# Patient Record
Sex: FEMALE | Race: WHITE | Employment: OTHER | URBAN - METROPOLITAN AREA
[De-identification: names, ages, dates, MRNs, and addresses within clinical notes are randomized per-mention and may not be internally consistent; named-entity substitution may affect disease eponyms.]

---

## 2018-01-22 PROBLEM — R94.31 ABNORMAL ECG: Status: ACTIVE | Noted: 2018-01-22

## 2018-01-22 PROBLEM — R01.1 HEART MURMUR: Status: ACTIVE | Noted: 2018-01-22

## 2018-01-22 PROBLEM — I10 ESSENTIAL HYPERTENSION: Status: ACTIVE | Noted: 2018-01-22

## 2019-04-02 ENCOUNTER — APPOINTMENT (OUTPATIENT)
Dept: CT IMAGING | Age: 74
End: 2019-04-02
Payer: MEDICARE

## 2019-04-02 ENCOUNTER — HOSPITAL ENCOUNTER (EMERGENCY)
Age: 74
Discharge: HOME | End: 2019-04-02
Attending: EMERGENCY MEDICINE
Payer: MEDICARE

## 2019-04-02 VITALS
HEIGHT: 62 IN | OXYGEN SATURATION: 96 % | HEART RATE: 84 BPM | RESPIRATION RATE: 14 BRPM | DIASTOLIC BLOOD PRESSURE: 67 MMHG | BODY MASS INDEX: 23.92 KG/M2 | WEIGHT: 130 LBS | TEMPERATURE: 98.2 F | SYSTOLIC BLOOD PRESSURE: 138 MMHG

## 2019-04-02 DIAGNOSIS — S32.10XA CLOSED FRACTURE OF SACRUM, UNSPECIFIED FRACTURE MORPHOLOGY, INITIAL ENCOUNTER (HCC): Primary | ICD-10-CM

## 2019-04-02 LAB
-: ABNORMAL
AMORPHOUS: ABNORMAL
BACTERIA: ABNORMAL
BILIRUBIN URINE: NEGATIVE
CASTS UA: ABNORMAL /LPF
COLOR: YELLOW
COMMENT UA: ABNORMAL
CRYSTALS, UA: ABNORMAL /HPF
EPITHELIAL CELLS UA: ABNORMAL /HPF
GLUCOSE URINE: NEGATIVE
KETONES, URINE: NEGATIVE
LEUKOCYTE ESTERASE, URINE: NEGATIVE
MUCUS: ABNORMAL
NITRITE, URINE: NEGATIVE
OTHER OBSERVATIONS UA: ABNORMAL
PH UA: 6
PROTEIN UA: NEGATIVE
RBC UA: ABNORMAL /HPF
RENAL EPITHELIAL, UA: ABNORMAL /HPF
SPECIFIC GRAVITY UA: 1.01
TRICHOMONAS: ABNORMAL
TURBIDITY: CLEAR
URINE HGB: ABNORMAL
UROBILINOGEN, URINE: NORMAL
WBC UA: ABNORMAL /HPF
YEAST: ABNORMAL

## 2019-04-02 PROCEDURE — 73700 CT LOWER EXTREMITY W/O DYE: CPT

## 2019-04-02 PROCEDURE — 81001 URINALYSIS AUTO W/SCOPE: CPT

## 2019-04-02 PROCEDURE — 99284 EMERGENCY DEPT VISIT MOD MDM: CPT

## 2019-04-02 ASSESSMENT — PAIN SCALES - GENERAL: PAINLEVEL_OUTOF10: 8

## 2019-04-02 ASSESSMENT — PAIN DESCRIPTION - ORIENTATION: ORIENTATION: LEFT

## 2019-04-02 ASSESSMENT — PAIN DESCRIPTION - PAIN TYPE: TYPE: ACUTE PAIN

## 2019-04-02 ASSESSMENT — PAIN DESCRIPTION - LOCATION: LOCATION: HIP

## 2019-04-02 NOTE — ED PROVIDER NOTES
[facility] ED  [address] [facility] [address]  Phone: [phone]  Fax: [phone]    Pt Name: Kathy Chan  MRN: [id number]  [place] 1945  Date of evaluation: 4/2/2019      CHIEF COMPLAINT       Chief Complaint   Patient presents with    Hip Pain     left     Fall         HISTORY OF PRESENT ILLNESS     Kathy Chan is a 68 y.o. female who presents fell and injured her left hip morning. Since she has a right pelvis fracture from a fall about a month ago. She states her pain is an 8 out of a 10 but does not want any pain medication at this time. Left leg symptoms. No back pain. No abdominal symptoms. Brought in by her relative. Her vital signs are normal with exception of a 762 systolic blood pressure        REVIEW OF SYSTEMS       Other ROS negative except as noted above. PAST MEDICAL HISTORY    has a past medical history of Arthritis, Hypertension, Pelvic fracture ([address] and Seizures ([address] SURGICAL HISTORY      has a past surgical history that includes Cholecystectomy; Tonsillectomy; and joint replacement (2014). CURRENT MEDICATIONS       Previous Medications    CALCIUM-VITAMIN D (OSCAL-500) 500-200 MG-UNIT PER TABLET    Take 1 tablet by mouth daily    CLOTRIMAZOLE (LOTRIMIN) 1 % CREAM    Apply topically 2 times daily Apply topically 2 times daily. ERYTHROMYCIN WITH ETHANOL (THERAMYCIN) 2 % EXTERNAL SOLUTION    Apply topically daily Apply topically daily.     HYDROXYCHLOROQUINE (PLAQUENIL) 200 MG TABLET    Take 200 mg by mouth daily    LEVETIRACETAM (KEPPRA) 250 MG TABLET    Take 500 mg by mouth 2 times daily    LORAZEPAM (ATIVAN) 1 MG TABLET    Take 1 mg by mouth nightly as needed for Anxiety    MELOXICAM (MOBIC PO)    Take by mouth    METOPROLOL (LOPRESSOR) 100 MG TABLET    Take 100 mg by mouth daily    MONTELUKAST (SINGULAIR) 10 MG TABLET    Take 10 mg by mouth nightly    MULTIPLE VITAMINS-MINERALS (THERAPEUTIC MULTIVITAMIN-MINERALS) TABLET    Take 1 tablet by mouth daily    OCCULT BLOOD STRP    by In Vitro route    PHENOBARBITAL (LUMINAL) 15 MG TABLET    Take 16.2 mg by mouth 2 times daily    PRIMIDONE (MYSOLINE) 250 MG TABLET    Take 250 mg by mouth 2 times daily    PROBIOTIC PRODUCT (PROBIOTIC ADVANCED PO)    Take by mouth    PSYLLIUM (METAMUCIL) 58.6 % PACKET    Take 1 packet by mouth daily       ALLERGIES     is allergic to latex; aloe; and codeine. FAMILY HISTORY     has no family status information on file. family history is not on file. SOCIAL HISTORY      reports that she has never smoked. She has never used smokeless tobacco. She reports that she does not drink alcohol or use drugs. PHYSICAL EXAM     INITIAL VITALS:  height is 5' 2\" (1.575 m) and weight is 59 kg (130 lb). Her oral temperature is 97.4 °F (36.3 °C). Her blood pressure is 163/86 (abnormal) and her pulse is 77. Her respiration is 14 and oxygen saturation is 96%. Constitutional: The patient is alert, well-developed, in no acute distress. Vital signs as noted. Left hip:  pain to palpation over the left hip with limited range of motion due to pain. The rest the pelvis is stable and nontender to palpation. The right hip is normal.  Abdomen soft positive bowel sounds without masses or tenderness  HEENT is negative and the heart and lung exams are normal  Abdomen is soft positive bowel sounds without masses or tenderness    DIFFERENTIAL DIAGNOSIS/ MEDICAL DECISION MAKING:     Social workers contacted and the patient will be transferred to [facility] for further care. Patient is agreeable    No complaint of pain while lying    Follow Exit Care instructions closely. I have reviewed the disposition diagnosis with the patient and or their family/guardian. I have answered their questions and given discharge instructions. They voiced understanding of these instructions and did not have any further questions or complaints.     DIAGNOSTIC RESULTS     RADIOLOGY:   Non-plain film images such as CT, Ultrasound and MRI are read by the radiologist. Plain radiographic images are visualized and preliminarily interpreted by the emergency physician with the below findings:  CT Hip Left WO Contrast   Preliminary Result   1. Acute nondisplaced extraforaminal left sacral alar fracture. 2. No definite proximal left femoral fracture; if there is continued concern,   MRI should be considered. 3. Ongoing healing of comminuted right pubic body fracture extending into   right inferior pubic ramus. LABS:  No results found for this visit on 04/02/19. ABNORMAL LABS:  Labs Reviewed - No data to display           EMERGENCY DEPARTMENT COURSE:   Vitals:    Vitals:    04/02/19 1052   BP: (!) 163/86   Pulse: 77   Resp: 14   Temp: 97.4 °F (36.3 °C)   TempSrc: Oral   SpO2: 96%   Weight: 59 kg (130 lb)   Height: 5' 2\" (1.575 m)     -------------------------  BP: (!) 163/86, Temp: 97.4 °F (36.3 °C), Pulse: 77, Resp: 14    See DDX/MDM  (Differential Diagnosis/Medical Decision Making) above. FINAL IMPRESSION      1. Closed fracture of sacrum, unspecified fracture morphology, initial encounter [facility])          DISPOSITION/PLAN   DISPOSITION Decision To Discharge 04/02/2019 02:39:00 PM      Condition on Disposition    Fair    PATIENT REFERRED TO:  No follow-up provider specified.     DISCHARGE MEDICATIONS:  New Prescriptions    No medications on file       (Please note that portions of this note were completed with a voice recognition program.  Efforts were made to edit the dictations but occasionally words are mis-transcribed.)    Finley DO  Attending Emergency Physician       Leonarda Rapp DO  04/02/19 DO Thor  04/02/19 4447

## 2019-04-02 NOTE — ED NOTES
Pt given lunch, positioned to sitting position. [facility]         Humberto Tran RN  04/02/19 5492

## 2019-04-02 NOTE — ED NOTES
Stefan Dunham, DO at bedside.  Social work called, message left for pot'l placement       Sy Kraft, RN  04/02/19 28628 Komal Hawthorne, RN  04/02/19 2310

## 2019-04-02 NOTE — ED NOTES
[place] staff at bedside, [facility] ok for transportation to be arranged. [facility] contacted.       Salem Carrel, RN  04/02/19 0202

## 2019-04-02 NOTE — ED NOTES
Spoke with armen at [facility], states they are working on getting a bed assignment for pt. Informed them that transport has been set up for around 1640. Verbalized understanding, states they will call back with a bed assignment and to get report.         Kamla Syeks RN  04/02/19 8253

## 2019-04-02 NOTE — ED NOTES
Pt to go to [facility] will be in to see patient and then we will arrange transportation to get pt to facility.         Raven Campbell RN  04/02/19 1060

## 2019-04-02 NOTE — ED NOTES
at the bedside to discuss placement options with pt et family.      Mervin Cordero RN  04/02/19 6443

## 2019-04-02 NOTE — ED NOTES
Pt presents to ed with c/o left sided hip pain s/p fall this am.  Pt states she fell around 0530 this am.  Pt reports that her knees felt weak and that she eased herself down to the ground. Pt states she did not hit her head or sustain any other injuries. Reports that EMS was called to assist her to get off the floor. Pt refused transport to a facility at that time. Pts family is at the bedside today and states pt has had freqent falls over the past month. States that pt recently fell and has a right pelvic fracture that is currently being managed medically. Pt lives alone at home but family states that in 2 wks she will be moving into an AL facility. Upon arrival pt is brought to room 9 via wheelchair. She is able to tx to the bed with moderate amt RN support. Pt appears to be generally weak. /  Currently rates her pain at an 8 on 0-10 scale, states she did take some tylenol this am.  There are no obvious deformities, bruises or swelling noted. PT denies numbness or tingling to the extremities. She is not taking any blood thinning medications at this time. Call light placed within reach, denies needs. Will continue to monitor.       Arthur Patel RN  04/02/19 3428

## 2020-04-29 ENCOUNTER — HOSPITAL ENCOUNTER (OUTPATIENT)
Age: 75
Discharge: HOME | End: 2020-04-29
Payer: MEDICARE

## 2020-04-29 PROCEDURE — U0002 COVID-19 LAB TEST NON-CDC: HCPCS

## 2020-04-30 ENCOUNTER — HOSPITAL ENCOUNTER (OUTPATIENT)
Age: 75
Discharge: HOME | End: 2020-04-30
Payer: MEDICARE

## 2020-04-30 PROCEDURE — U0003 INFECTIOUS AGENT DETECTION BY NUCLEIC ACID (DNA OR RNA); SEVERE ACUTE RESPIRATORY SYNDROME CORONAVIRUS 2 (SARS-COV-2) (CORONAVIRUS DISEASE [COVID-19]), AMPLIFIED PROBE TECHNIQUE, MAKING USE OF HIGH THROUGHPUT TECHNOLOGIES AS DESCRIBED BY CMS-2020-01-R: HCPCS

## 2020-05-03 LAB — SARS-COV-2, NAA: NOT DETECTED

## 2020-05-04 ENCOUNTER — TELEPHONE (OUTPATIENT)
Dept: PRIMARY CARE CLINIC | Age: 75
End: 2020-05-04

## 2022-09-18 ENCOUNTER — HOSPITAL ENCOUNTER (EMERGENCY)
Age: 77
Discharge: HOME | End: 2022-09-19
Attending: EMERGENCY MEDICINE
Payer: MEDICARE

## 2022-09-18 ENCOUNTER — APPOINTMENT (OUTPATIENT)
Dept: CT IMAGING | Age: 77
End: 2022-09-18
Payer: MEDICARE

## 2022-09-18 ENCOUNTER — APPOINTMENT (OUTPATIENT)
Dept: GENERAL RADIOLOGY | Age: 77
End: 2022-09-18
Payer: MEDICARE

## 2022-09-18 DIAGNOSIS — S83.91XA SPRAIN OF RIGHT KNEE, UNSPECIFIED LIGAMENT, INITIAL ENCOUNTER: ICD-10-CM

## 2022-09-18 DIAGNOSIS — J01.10 ACUTE FRONTAL SINUSITIS, RECURRENCE NOT SPECIFIED: ICD-10-CM
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
## 2022-09-18 DIAGNOSIS — R29.6 MULTIPLE FALLS: ICD-10-CM

## 2022-09-18 DIAGNOSIS — N39.0 URINARY TRACT INFECTION WITH HEMATURIA, SITE UNSPECIFIED: ICD-10-CM

## 2022-09-18 DIAGNOSIS — R31.9 URINARY TRACT INFECTION WITH HEMATURIA, SITE UNSPECIFIED: ICD-10-CM

## 2022-09-18 LAB
ABSOLUTE EOS #: 0.1 K/UL (ref 0–0.4)
ABSOLUTE LYMPH #: 0.6 K/UL (ref 1–4.8)
ABSOLUTE MONO #: 0.3 K/UL (ref 0.1–1.2)
ANION GAP SERPL CALCULATED.3IONS-SCNC: 10 MMOL/L (ref 9–17)
BASOPHILS # BLD: 1 % (ref 0–2)
BASOPHILS ABSOLUTE: 0 K/UL (ref 0–0.2)
BUN BLDV-MCNC: 19 MG/DL (ref 8–23)
CALCIUM SERPL-MCNC: 8.6 MG/DL (ref 8.6–10.4)
CHLORIDE BLD-SCNC: 102 MMOL/L (ref 98–107)
CO2: 27 MMOL/L (ref 20–31)
CREAT SERPL-MCNC: 0.77 MG/DL (ref 0.5–0.9)
EOSINOPHILS RELATIVE PERCENT: 2 % (ref 1–4)
GFR AFRICAN AMERICAN: >60 ML/MIN
GFR NON-AFRICAN AMERICAN: >60 ML/MIN
GFR SERPL CREATININE-BSD FRML MDRD: ABNORMAL ML/MIN/{1.73_M2}
GLUCOSE BLD-MCNC: 118 MG/DL (ref 70–99)
HCT VFR BLD CALC: 30.1 % (ref 36–46)
HEMOGLOBIN: 10.7 G/DL (ref 12–16)
INR BLD: 1
LYMPHOCYTES # BLD: 11 % (ref 24–44)
MCH RBC QN AUTO: 33.1 PG (ref 26–34)
MCHC RBC AUTO-ENTMCNC: 35.5 G/DL (ref 31–37)
MCV RBC AUTO: 93.3 FL (ref 80–100)
MONOCYTES # BLD: 5 % (ref 2–11)
PDW BLD-RTO: 13.5 % (ref 12.5–15.4)
PLATELET # BLD: 127 K/UL (ref 140–450)
PMV BLD AUTO: 6.9 FL (ref 6–12)
POTASSIUM SERPL-SCNC: 4 MMOL/L (ref 3.7–5.3)
PROTHROMBIN TIME: 10.6 SEC (ref 9.4–12.6)
RBC # BLD: 3.22 M/UL (ref 4–5.2)
SEG NEUTROPHILS: 81 % (ref 36–66)
SEGMENTED NEUTROPHILS ABSOLUTE COUNT: 4.8 K/UL (ref 1.8–7.7)
SODIUM BLD-SCNC: 139 MMOL/L (ref 135–144)
WBC # BLD: 5.8 K/UL (ref 3.5–11)

## 2022-09-18 PROCEDURE — 85610 PROTHROMBIN TIME: CPT

## 2022-09-18 PROCEDURE — 73502 X-RAY EXAM HIP UNI 2-3 VIEWS: CPT

## 2022-09-18 PROCEDURE — 96365 THER/PROPH/DIAG IV INF INIT: CPT

## 2022-09-18 PROCEDURE — 71045 X-RAY EXAM CHEST 1 VIEW: CPT

## 2022-09-18 PROCEDURE — 80048 BASIC METABOLIC PNL TOTAL CA: CPT

## 2022-09-18 PROCEDURE — 85025 COMPLETE CBC W/AUTO DIFF WBC: CPT

## 2022-09-18 PROCEDURE — 70450 CT HEAD/BRAIN W/O DYE: CPT

## 2022-09-18 PROCEDURE — 99284 EMERGENCY DEPT VISIT MOD MDM: CPT

## 2022-09-18 PROCEDURE — 96375 TX/PRO/DX INJ NEW DRUG ADDON: CPT

## 2022-09-18 PROCEDURE — 73560 X-RAY EXAM OF KNEE 1 OR 2: CPT

## 2022-09-18 RX ORDER — ACETAMINOPHEN 160 MG
TABLET,DISINTEGRATING ORAL DAILY
COMMUNITY

## 2022-09-18 RX ORDER — LOPERAMIDE HYDROCHLORIDE 2 MG/1
2 CAPSULE ORAL PRN
COMMUNITY

## 2022-09-18 RX ORDER — LORATADINE 10 MG/1
10 CAPSULE, LIQUID FILLED ORAL DAILY
COMMUNITY

## 2022-09-18 RX ORDER — LISINOPRIL 10 MG/1
10 TABLET ORAL DAILY
COMMUNITY

## 2022-09-18 RX ORDER — CARVEDILOL 12.5 MG/1
12.5 TABLET ORAL 2 TIMES DAILY WITH MEALS
COMMUNITY

## 2022-09-18 RX ORDER — FAMOTIDINE 20 MG/1
20 TABLET, FILM COATED ORAL 2 TIMES DAILY
COMMUNITY

## 2022-09-18 RX ORDER — KETOCONAZOLE 20 MG/G
CREAM TOPICAL DAILY
COMMUNITY

## 2022-09-18 RX ORDER — FERROUS SULFATE 325(65) MG
325 TABLET ORAL
COMMUNITY

## 2022-09-18 RX ORDER — POTASSIUM CHLORIDE 1.5 G/1.77G
20 POWDER, FOR SOLUTION ORAL DAILY
COMMUNITY

## 2022-09-18 RX ORDER — TAMSULOSIN HYDROCHLORIDE 0.4 MG/1
0.4 CAPSULE ORAL DAILY
COMMUNITY

## 2022-09-18 RX ORDER — VITAMIN E 268 MG
400 CAPSULE ORAL DAILY
COMMUNITY

## 2022-09-18 RX ORDER — CEPHALEXIN 500 MG/1
500 CAPSULE ORAL AS NEEDED
COMMUNITY

## 2022-09-18 RX ORDER — TRIAMCINOLONE ACETONIDE 1 MG/G
CREAM TOPICAL DAILY PRN
COMMUNITY

## 2022-09-18 RX ORDER — ACETAMINOPHEN 325 MG/1
650 TABLET ORAL EVERY 6 HOURS PRN
COMMUNITY

## 2022-09-18 RX ORDER — ASPIRIN 81 MG/1
81 TABLET ORAL DAILY
COMMUNITY

## 2022-09-18 RX ORDER — CLONIDINE HYDROCHLORIDE 0.1 MG/1
0.1 TABLET ORAL 2 TIMES DAILY
COMMUNITY

## 2022-09-19 ENCOUNTER — APPOINTMENT (OUTPATIENT)
Dept: CT IMAGING | Age: 77
End: 2022-09-19
Payer: MEDICARE

## 2022-09-19 VITALS
TEMPERATURE: 98.3 F | BODY MASS INDEX: 28 KG/M2 | RESPIRATION RATE: 16 BRPM | DIASTOLIC BLOOD PRESSURE: 90 MMHG | WEIGHT: 164 LBS | HEIGHT: 64 IN | HEART RATE: 70 BPM | SYSTOLIC BLOOD PRESSURE: 155 MMHG | OXYGEN SATURATION: 94 %

## 2022-09-19 LAB
BILIRUBIN URINE: NEGATIVE
COLOR: YELLOW
EPITHELIAL CELLS UA: ABNORMAL /HPF (ref 0–5)
GLUCOSE URINE: NEGATIVE
KETONES, URINE: NEGATIVE
LEUKOCYTE ESTERASE, URINE: ABNORMAL
NITRITE, URINE: NEGATIVE
OTHER OBSERVATIONS UA: ABNORMAL
PH UA: 6 (ref 5–8)
PROTEIN UA: NEGATIVE
RBC UA: ABNORMAL /HPF (ref 0–2)
SPECIFIC GRAVITY UA: 1.02 (ref 1–1.03)
TURBIDITY: CLEAR
URINE HGB: ABNORMAL
UROBILINOGEN, URINE: NORMAL
WBC UA: ABNORMAL /HPF (ref 0–5)

## 2022-09-19 PROCEDURE — 96375 TX/PRO/DX INJ NEW DRUG ADDON: CPT

## 2022-09-19 PROCEDURE — 81001 URINALYSIS AUTO W/SCOPE: CPT

## 2022-09-19 PROCEDURE — 87086 URINE CULTURE/COLONY COUNT: CPT

## 2022-09-19 PROCEDURE — 73700 CT LOWER EXTREMITY W/O DYE: CPT

## 2022-09-19 PROCEDURE — 96365 THER/PROPH/DIAG IV INF INIT: CPT

## 2022-09-19 PROCEDURE — 6360000002 HC RX W HCPCS: Performed by: EMERGENCY MEDICINE

## 2022-09-19 PROCEDURE — 2580000003 HC RX 258: Performed by: EMERGENCY MEDICINE

## 2022-09-19 RX ORDER — SULFAMETHOXAZOLE AND TRIMETHOPRIM 800; 160 MG/1; MG/1
1 TABLET ORAL 2 TIMES DAILY
Qty: 20 TABLET | Refills: 0 | Status: SHIPPED | OUTPATIENT
Start: 2022-09-19 | End: 2022-09-29

## 2022-09-19 RX ORDER — LORAZEPAM 2 MG/ML
1 INJECTION INTRAMUSCULAR ONCE
Status: DISCONTINUED | OUTPATIENT
Start: 2022-09-19 | End: 2022-09-19

## 2022-09-19 RX ORDER — LORAZEPAM 2 MG/ML
1 INJECTION INTRAMUSCULAR ONCE
Status: COMPLETED | OUTPATIENT
Start: 2022-09-19 | End: 2022-09-19

## 2022-09-19 RX ORDER — LORAZEPAM 1 MG/1
1 TABLET ORAL ONCE
Status: DISCONTINUED | OUTPATIENT
Start: 2022-09-19 | End: 2022-09-19

## 2022-09-19 RX ADMIN — CEFTRIAXONE SODIUM 1000 MG: 1 INJECTION, POWDER, FOR SOLUTION INTRAMUSCULAR; INTRAVENOUS at 02:04

## 2022-09-19 RX ADMIN — LORAZEPAM 1 MG: 2 INJECTION INTRAMUSCULAR; INTRAVENOUS at 03:00

## 2022-09-19 ASSESSMENT — ENCOUNTER SYMPTOMS
EYE REDNESS: 0
DIARRHEA: 0
EYE PAIN: 0
SORE THROAT: 0
WHEEZING: 0
STRIDOR: 0
CONSTIPATION: 0
COUGH: 0
EYE DISCHARGE: 0
SHORTNESS OF BREATH: 0
NAUSEA: 0
ABDOMINAL PAIN: 0
VOMITING: 0
COLOR CHANGE: 0

## 2022-09-19 NOTE — ED PROVIDER NOTES
[address] [facility] ED  EMERGENCY DEPARTMENT ENCOUNTER      Pt Name: Kory Snider  MRN: [id number]  [place] 1945  Date of evaluation: 9/18/2022  Provider: Sheila Meléndez MD    CHIEF COMPLAINT       Chief Complaint   Patient presents with    Leslye Claros once this morning & injured left elbow    Brought by squad d/t second fall- pt was standing reaching backwards towards air conditioner fell on right knee which she had repair of knee back in june       CRITICAL CARE TIME   Total Critical Care time was 10 minutes, excluding separately reportable procedures. There was a high probability of clinically significant/life threatening deterioration in the patient's condition which required my urgent intervention. HISTORY OF PRESENT ILLNESS  (Location/Symptom, Timing/Onset, Context/Setting, Quality, Duration, Modifying Factors, Severity.)   Kory Snider is a 69 y.o. female who presents to the emergency department for a fall. She relates she was standing reaching backwards toward the air conditioner and she fell onto the right knee. She had repair of this knee back in June. This is her second fall today. She fell once this morning and relates she scraped up her left elbow and staff at facility wrapped her up. She relates it really does not bother her much. She said the most difficult for her to come in today because she was complaining about knee pain. Family at bedside relates that they are concerned that she has been having mini strokes and are wondering if we can evaluate her for that. Nursing Notes were reviewed. REVIEW OF SYSTEMS    (2-9 systems for level 4, 10 or more for level 5)     Review of Systems   Constitutional:  Negative for activity change, appetite change, chills, fatigue and fever. HENT:  Negative for congestion, ear pain and sore throat. Eyes:  Negative for pain, discharge and redness.    Respiratory:  Negative for cough, shortness of breath, wheezing and stridor. Cardiovascular:  Negative for chest pain. Gastrointestinal:  Negative for abdominal pain, constipation, diarrhea, nausea and vomiting. Genitourinary:  Negative for decreased urine volume and difficulty urinating. Musculoskeletal:  Negative for arthralgias and myalgias. Right knee pain and swelling, left elbow injury   Skin:  Negative for color change and rash. Neurological:  Negative for dizziness, syncope, speech difficulty, weakness, light-headedness, numbness and headaches. Psychiatric/Behavioral:  Negative for behavioral problems and confusion. Except as noted above the remainder of the review of systems was reviewed and negative. PAST MEDICAL HISTORY     Past Medical History:   Diagnosis Date    Arthritis     Hypertension     Pelvic fracture ([facility] 2019    right    Seizures ([facility]        SURGICAL HISTORY       Past Surgical History:   Procedure Laterality Date    CHOLECYSTECTOMY      JOINT REPLACEMENT  2014    left knee    TONSILLECTOMY         CURRENT MEDICATIONS       Previous Medications    ACETAMINOPHEN (TYLENOL) 325 MG TABLET    Take 650 mg by mouth every 6 hours as needed for Pain    ASPIRIN 81 MG EC TABLET    Take 81 mg by mouth daily    CALCIUM-VITAMIN D (OSCAL-500) 500-200 MG-UNIT PER TABLET    Take 1 tablet by mouth daily    CARVEDILOL (COREG) 12.5 MG TABLET    Take 12.5 mg by mouth 2 times daily (with meals)    CEPHALEXIN (KEFLEX) 500 MG CAPSULE    Take 500 mg by mouth as needed    CHOLECALCIFEROL (VITAMIN D3) 50 MCG (2000 UT) CAPS    Take by mouth daily    CLONIDINE (CATAPRES) 0.1 MG TABLET    Take 0.1 mg by mouth 2 times daily    FAMOTIDINE (PEPCID) 20 MG TABLET    Take 20 mg by mouth 2 times daily    FERROUS SULFATE (IRON 325) 325 (65 FE) MG TABLET    Take 325 mg by mouth daily (with breakfast)    KETOCONAZOLE (NIZORAL) 2 % CREAM    Apply topically daily Apply topically daily.     LEVETIRACETAM (KEPPRA) 250 MG TABLET    Take 500 mg by mouth 2 times daily 3 tablets by mouth BID    LISINOPRIL (PRINIVIL;ZESTRIL) 10 MG TABLET    Take 10 mg by mouth daily    LOPERAMIDE (IMODIUM) 2 MG CAPSULE    Take 2 mg by mouth as needed for Diarrhea    LORATADINE (CLARITIN) 10 MG CAPSULE    Take 10 mg by mouth daily    LORAZEPAM (ATIVAN) 1 MG TABLET    Take 1 mg by mouth nightly as needed for Anxiety    MONTELUKAST (SINGULAIR) 10 MG TABLET    Take 10 mg by mouth nightly    PHENOBARBITAL (LUMINAL) 15 MG TABLET    Take 16.2 mg by mouth 2 times daily    POTASSIUM CHLORIDE (KLOR-CON) 20 MEQ PACKET    Take 20 mEq by mouth daily    PRIMIDONE (MYSOLINE) 250 MG TABLET    Take 250 mg by mouth 2 times daily    TAMSULOSIN (FLOMAX) 0.4 MG CAPSULE    Take 0.4 mg by mouth daily    TRIAMCINOLONE (KENALOG) 0.1 % CREAM    Apply topically daily as needed Apply topically 2 times daily. VITAMIN E 400 UNIT CAPSULE    Take 400 Units by mouth daily       ALLERGIES     Latex, Aloe, Banana, Benadryl [diphenhydramine], Ciprofloxacin, Pork-derived products, and Codeine    FAMILY HISTORY     No family history on file. No family status information on file. SOCIAL HISTORY      reports that she has never smoked. She has never used smokeless tobacco. She reports that she does not drink alcohol and does not use drugs. PHYSICAL EXAM    (up to 7 for level 4, 8 or more for level 5)     ED Triage Vitals [09/18/22 2154]   BP Temp Temp Source Heart Rate Resp SpO2 Height Weight   (!) 161/84 98.3 °F (36.8 °C) Oral 71 16 99 % 5' 4\" (1.626 m) 164 lb (74.4 kg)     Physical Exam  Vitals and nursing note reviewed. Constitutional:       General: She is not in acute distress. Appearance: She is well-developed. She is not ill-appearing, toxic-appearing or diaphoretic. HENT:      Head: Normocephalic and atraumatic.       Right Ear: External ear normal.      Left Ear: External ear normal.      Nose: Nose normal.      Mouth/Throat:      Mouth: Mucous membranes are moist.   Eyes:      General:         Right eye: No discharge. Left eye: No discharge. Conjunctiva/sclera: Conjunctivae normal.      Pupils: Pupils are equal, round, and reactive to light. Cardiovascular:      Rate and Rhythm: Normal rate and regular rhythm. Heart sounds: Normal heart sounds. No murmur heard. Pulmonary:      Effort: Pulmonary effort is normal. No respiratory distress. Breath sounds: Normal breath sounds. No wheezing, rhonchi or rales. Chest:      Chest wall: No tenderness. Abdominal:      General: Bowel sounds are normal. There is no distension. Palpations: Abdomen is soft. There is no mass. Tenderness: no abdominal tenderness There is no guarding or rebound. Musculoskeletal:         General: Swelling, tenderness and signs of injury present. No deformity. Cervical back: Normal range of motion and neck supple. No rigidity or tenderness. Right lower leg: No edema. Left lower leg: No edema. Lymphadenopathy:      Cervical: No cervical adenopathy. Skin:     General: Skin is warm. Coloration: Skin is not pale. Findings: No rash. Neurological:      General: No focal deficit present. Mental Status: She is alert and oriented to person, place, and time. Mental status is at baseline. Sensory: No sensory deficit. Motor: No abnormal muscle tone. Psychiatric:         Mood and Affect: Mood normal.         Behavior: Behavior normal.        DIAGNOSTIC RESULTS     EKG: All EKG's are interpreted by the Emergency Department Physician who either signs or Co-signs this chart in the absence of a cardiologist.    none    RADIOLOGY:   Non-plain film images such as CT, Ultrasound and MRI are read by the radiologist.   Interpretation per the Radiologist below, if available at the time of this note:    CT KNEE RIGHT WO CONTRAST   Final Result   Status post right total knee arthroplasty. No CT evidence of periprosthetic fracture. Small knee joint effusion.   Aspiration may be performed if clinical suspicion   for septic arthritis is high. CT Head W/O Contrast   Final Result   No acute bleed or midline shift. Paranasal sinus disease including a right frontal sinus air-fluid level which   can be seen with acute bacterial sinusitis. XR CHEST PORTABLE   Final Result   No acute cardiopulmonary process         XR HIP RIGHT (2-3 VIEWS)   Final Result   Total hip arthropasty without acute hardware complication. XR KNEE RIGHT (1-2 VIEWS)   Final Result   Total knee arthropasty without acute hardware complication. ED BEDSIDE ULTRASOUND:   Performed by ED Physician - none    LABS:  Labs Reviewed   CBC WITH AUTO DIFFERENTIAL - Abnormal; Notable for the following components:       Result Value    RBC 3.22 (*)     Hemoglobin 10.7 (*)     Hematocrit 30.1 (*)     Platelets 038 (*)     Seg Neutrophils 81 (*)     Lymphocytes 11 (*)     Absolute Lymph # 0.60 (*)     All other components within normal limits   BASIC METABOLIC PANEL - Abnormal; Notable for the following components:    Glucose 118 (*)     All other components within normal limits   URINALYSIS WITH REFLEX TO CULTURE - Abnormal; Notable for the following components:    Urine Hgb TRACE (*)     Leukocyte Esterase, Urine SMALL (*)     All other components within normal limits   MICROSCOPIC URINALYSIS - Abnormal; Notable for the following components:    Other Observations UA Culture ordered based on defined criteria. (*)     All other components within normal limits   CULTURE, URINE   PROTIME-INR       All other labs were within normal range or not returned as of this dictation.     EMERGENCY DEPARTMENT COURSE and DIFFERENTIAL DIAGNOSIS/MDM:   Vitals:    Vitals:    09/18/22 2154 09/18/22 2325 09/18/22 2326 09/19/22 0206   BP: (!) 161/84 (!) 177/78 (!) 172/75 (!) 155/90   Pulse: 71 70     Resp: 16 16     Temp: 98.3 °F (36.8 °C)      TempSrc: Oral      SpO2: 99% 97%  94%   Weight: 74.4 kg (164 lb) Height: 5' 4\" (1.626 m)        We did discuss labs and imaging. Certainly concern for problems with hardware or new fracture. It does not sound like she was able to walk after the fall and EMS was called. She tells me that she is not having any pain now but obviously had pain or they would not have called for her. She denies any other pain at this time. But is agreeable to work-up. We did go over work-up with her while sister was in the room and I have some concerns about her multiple falls and now has sinusitis and UTI. She initially seems agreeable to be admitted but after sister has left she relates that she does not want to be admitted unless there is something wrong with her knee otherwise she would like to just go back home and she is pretty adamant about this. CT is negative and so we will go ahead and [facility] per the patient's wishes. CONSULTS:  None    PROCEDURES:  None    FINAL IMPRESSION      1. Fall, initial encounter    2. Acute frontal sinusitis, recurrence not specified    3. Sprain of right knee, unspecified ligament, initial encounter    4. Urinary tract infection with hematuria, site unspecified    5.  Multiple falls          DISPOSITION/PLAN   DISPOSITION Decision To Discharge 09/19/2022 03:05:32 AM      PATIENT REFERRED TO:  DO Hayden Carrasquillo 19  [phone]    Call in 1 day      DISCHARGE MEDICATIONS:  New Prescriptions    SULFAMETHOXAZOLE-TRIMETHOPRIM (BACTRIM DS) 800-160 MG PER TABLET    Take 1 tablet by mouth 2 times daily for 10 days       (Please note that portions of this note were completed with a voice recognition program.  Efforts were made to edit the dictations but occasionally words are mis-transcribed.)    Tyree Frankel, MD  Attending Emergency Physician        Tyree Frankel, MD  09/19/22 7580

## 2022-09-19 NOTE — ED NOTES
Pt to ER per EMS, transferred to bed, full assist. Pt reports she has fallen four times today and presents tonight with left elbow pain and right knee pain. Numerous bruises noted to arms and legs, abrasion to left elbow, dressing changed.  Pt calm, cooperative, respers even non labored, skin warm pink, no distress, here for eval.      Cristin Sloan RN  09/19/22 8913

## 2022-09-19 NOTE — ED NOTES
Pt incont, all bed linens and clothes changed, noy care provided, depends placed.       Magdalena Tai RN  09/19/22 9897

## 2022-09-20 LAB
CULTURE: NORMAL
SPECIMEN DESCRIPTION: NORMAL

## 2023-08-03 ENCOUNTER — OFFICE VISIT (OUTPATIENT)
Dept: SURGERY | Age: 78
End: 2023-08-03

## 2023-08-03 VITALS
HEIGHT: 64 IN | WEIGHT: 171 LBS | OXYGEN SATURATION: 95 % | HEART RATE: 71 BPM | SYSTOLIC BLOOD PRESSURE: 142 MMHG | DIASTOLIC BLOOD PRESSURE: 85 MMHG | BODY MASS INDEX: 29.19 KG/M2 | TEMPERATURE: 97.3 F

## 2023-08-03 DIAGNOSIS — Z85.038 HISTORY OF COLON CANCER IN ADULTHOOD: Primary | ICD-10-CM

## 2023-08-03 RX ORDER — SOD SULF/POT CHLORIDE/MAG SULF 1.479 G
TABLET ORAL
Qty: 12 TABLET | Refills: 0 | Status: SHIPPED | OUTPATIENT
Start: 2023-08-03

## 2023-08-03 NOTE — PROGRESS NOTES
[address] [facility]  [address] [facility] [address]  Dept: [phone]    Patient:  Sarah Perez  YOB: 1945  Date: 8/3/2023     The patient is a 69 y.o. female who presents today for consult of the following problems:     Chief Complaint: New Patient (History of colon cancer /Last colonoscopy 2/13/2019 )       HPI:     Name of surgery: Right hemicolectomy for cancer. Surgery date: February 2018. Last colonoscopy: 2/13/2019  Last imaging:    CT - 11/2021 no evidence of recurrent cancer. Cancer stage: T3 N1 M0  CEA: 3.5 in April 2023. Patient presents today for routine cancer surveillance. She is living comfortably at [facility]. Denies any rectal bleeding, loss of appetite, loss of weight or unusual abdominal pain. History:     Past Medical History:   Diagnosis Date    Arthritis     Hypertension     Pelvic fracture ([address]) 2019    right    Seizures ([address])      Past Surgical History:   Procedure Laterality Date    CHOLECYSTECTOMY      JOINT REPLACEMENT  2014    left knee    TONSILLECTOMY       No family history on file.   Social History     Tobacco Use    Smoking status: Never    Smokeless tobacco: Never   Substance Use Topics    Alcohol use: No    Drug use: No     Current Outpatient Medications   Medication Sig Dispense Refill    acetaminophen (TYLENOL) 325 MG tablet Take 2 tablets by mouth every 6 hours as needed for Pain      aspirin 81 MG EC tablet Take 1 tablet by mouth daily      carvedilol (COREG) 12.5 MG tablet Take 1 tablet by mouth 2 times daily (with meals)      cloNIDine (CATAPRES) 0.1 MG tablet Take 1 tablet by mouth 2 times daily      famotidine (PEPCID) 20 MG tablet Take 1 tablet by mouth 2 times daily      ferrous sulfate (IRON 325) 325 (65 Fe) MG tablet Take 1 tablet by mouth daily (with breakfast)      tamsulosin (FLOMAX) 0.4 MG capsule Take 1 capsule by mouth daily

## 2023-08-04 ENCOUNTER — TELEPHONE (OUTPATIENT)
Dept: SURGERY | Age: 78
End: 2023-08-04

## 2023-08-04 NOTE — TELEPHONE ENCOUNTER
Pt would like to know if she should take her seizure and BP medication the morning of her surgery 8/9/23. Pt knows that the facility [facility]) may have this information but would like to know personally.

## 2023-08-08 ENCOUNTER — ANESTHESIA EVENT (OUTPATIENT)
Dept: OPERATING ROOM | Age: 78
End: 2023-08-08

## 2023-08-09 ENCOUNTER — ANESTHESIA (OUTPATIENT)
Dept: OPERATING ROOM | Age: 78
End: 2023-08-09

## 2023-08-09 ENCOUNTER — APPOINTMENT (OUTPATIENT)
Dept: CT IMAGING | Age: 78
End: 2023-08-09
Payer: MEDICARE

## 2023-08-09 ENCOUNTER — APPOINTMENT (OUTPATIENT)
Dept: GENERAL RADIOLOGY | Age: 78
End: 2023-08-09
Payer: MEDICARE

## 2023-08-09 ENCOUNTER — APPOINTMENT (OUTPATIENT)
Age: 78
End: 2023-08-09
Attending: STUDENT IN AN ORGANIZED HEALTH CARE EDUCATION/TRAINING PROGRAM
Payer: MEDICARE

## 2023-08-09 ENCOUNTER — HOSPITAL ENCOUNTER (INPATIENT)
Age: 78
LOS: 3 days | Discharge: NURSING, REHAB OR LONG-TERM CARE | End: 2023-08-12
Attending: EMERGENCY MEDICINE | Admitting: STUDENT IN AN ORGANIZED HEALTH CARE EDUCATION/TRAINING PROGRAM
Payer: MEDICARE

## 2023-08-09 DIAGNOSIS — I63.81 LACUNAR INFARCT, ACUTE (HCC): Primary | ICD-10-CM

## 2023-08-09 PROBLEM — I61.9 CVA (CEREBROVASCULAR ACCIDENT DUE TO INTRACEREBRAL HEMORRHAGE) (HCC): Status: ACTIVE | Noted: 2023-08-09

## 2023-08-09 LAB
ALBUMIN SERPL-MCNC: 3.8 G/DL (ref 3.5–5.2)
ALBUMIN/GLOB SERPL: 1.1 {RATIO} (ref 1–2.5)
ALP SERPL-CCNC: 128 U/L (ref 35–104)
ALT SERPL-CCNC: 22 U/L (ref 5–33)
ANION GAP SERPL CALCULATED.3IONS-SCNC: 12 MMOL/L (ref 9–17)
AST SERPL-CCNC: 25 U/L
BASOPHILS # BLD: 0 K/UL (ref 0–0.2)
BASOPHILS NFR BLD: 0 % (ref 0–2)
BILIRUB SERPL-MCNC: 0.5 MG/DL (ref 0.3–1.2)
BUN SERPL-MCNC: 21 MG/DL (ref 8–23)
CALCIUM SERPL-MCNC: 9.3 MG/DL (ref 8.6–10.4)
CHLORIDE SERPL-SCNC: 97 MMOL/L (ref 98–107)
CO2 SERPL-SCNC: 23 MMOL/L (ref 20–31)
CREAT SERPL-MCNC: 0.8 MG/DL (ref 0.5–0.9)
ECHO AO ROOT DIAM: 3.7 CM
ECHO AO ROOT INDEX: 2.02 CM/M2
ECHO AV AREA PEAK VELOCITY: 2.5 CM2
ECHO AV AREA VTI: 2.3 CM2
ECHO AV AREA/BSA PEAK VELOCITY: 1.4 CM2/M2
ECHO AV AREA/BSA VTI: 1.3 CM2/M2
ECHO AV MEAN GRADIENT: 2 MMHG
ECHO AV MEAN VELOCITY: 0.7 M/S
ECHO AV PEAK GRADIENT: 5 MMHG
ECHO AV PEAK VELOCITY: 1.2 M/S
ECHO AV VELOCITY RATIO: 0.83
ECHO AV VTI: 25.4 CM
ECHO BSA: 1.87 M2
ECHO EST RA PRESSURE: 5 MMHG
ECHO IVC PROX: 1.6 CM
ECHO LA AREA 2C: 17.1 CM2
ECHO LA AREA 4C: 20.5 CM2
ECHO LA DIAMETER INDEX: 2.24 CM/M2
ECHO LA DIAMETER: 4.1 CM
ECHO LA MAJOR AXIS: 5.2 CM
ECHO LA MINOR AXIS: 4.4 CM
ECHO LA TO AORTIC ROOT RATIO: 1.11
ECHO LA VOL 2C: 53 ML (ref 22–52)
ECHO LA VOL 4C: 60 ML (ref 22–52)
ECHO LA VOL BP: 61 ML (ref 22–52)
ECHO LA VOL/BSA BIPLANE: 33 ML/M2 (ref 16–34)
ECHO LA VOLUME INDEX A2C: 29 ML/M2 (ref 16–34)
ECHO LA VOLUME INDEX A4C: 33 ML/M2 (ref 16–34)
ECHO LV E' LATERAL VELOCITY: 9 CM/S
ECHO LV E' SEPTAL VELOCITY: 5 CM/S
ECHO LV EDV A2C: 62 ML
ECHO LV EDV A4C: 74 ML
ECHO LV EDV INDEX A4C: 40 ML/M2
ECHO LV EDV NDEX A2C: 34 ML/M2
ECHO LV EJECTION FRACTION A2C: 61 %
ECHO LV EJECTION FRACTION A4C: 58 %
ECHO LV EJECTION FRACTION BIPLANE: 60 % (ref 55–100)
ECHO LV ESV A2C: 24 ML
ECHO LV ESV A4C: 31 ML
ECHO LV ESV INDEX A2C: 13 ML/M2
ECHO LV ESV INDEX A4C: 17 ML/M2
ECHO LV FRACTIONAL SHORTENING: 29 % (ref 28–44)
ECHO LV INTERNAL DIMENSION DIASTOLE INDEX: 2.3 CM/M2
ECHO LV INTERNAL DIMENSION DIASTOLIC: 4.2 CM (ref 3.9–5.3)
ECHO LV INTERNAL DIMENSION SYSTOLIC INDEX: 1.64 CM/M2
ECHO LV INTERNAL DIMENSION SYSTOLIC: 3 CM
ECHO LV IVSD: 1.5 CM (ref 0.6–0.9)
ECHO LV MASS 2D: 200.6 G (ref 67–162)
ECHO LV MASS INDEX 2D: 109.6 G/M2 (ref 43–95)
ECHO LV POSTERIOR WALL DIASTOLIC: 1.1 CM (ref 0.6–0.9)
ECHO LV RELATIVE WALL THICKNESS RATIO: 0.52
ECHO LVOT AREA: 2.8 CM2
ECHO LVOT AV VTI INDEX: 0.82
ECHO LVOT DIAM: 1.9 CM
ECHO LVOT MEAN GRADIENT: 2 MMHG
ECHO LVOT PEAK GRADIENT: 4 MMHG
ECHO LVOT PEAK VELOCITY: 1 M/S
ECHO LVOT STROKE VOLUME INDEX: 32.4 ML/M2
ECHO LVOT SV: 59.2 ML
ECHO LVOT VTI: 20.9 CM
ECHO MV A VELOCITY: 0.64 M/S
ECHO MV AREA VTI: 3.4 CM2
ECHO MV E DECELERATION TIME (DT): 257 MS
ECHO MV E VELOCITY: 0.49 M/S
ECHO MV E/A RATIO: 0.77
ECHO MV E/E' LATERAL: 5.44
ECHO MV E/E' RATIO (AVERAGED): 7.62
ECHO MV E/E' SEPTAL: 9.8
ECHO MV LVOT VTI INDEX: 0.84
ECHO MV MAX VELOCITY: 0.8 M/S
ECHO MV MEAN GRADIENT: 1 MMHG
ECHO MV MEAN VELOCITY: 0.4 M/S
ECHO MV PEAK GRADIENT: 3 MMHG
ECHO MV VTI: 17.5 CM
ECHO PV MAX VELOCITY: 0.9 M/S
ECHO PV PEAK GRADIENT: 3 MMHG
ECHO RIGHT VENTRICULAR SYSTOLIC PRESSURE (RVSP): 33 MMHG
ECHO RV FREE WALL PEAK S': 15 CM/S
ECHO TV REGURGITANT MAX VELOCITY: 2.63 M/S
ECHO TV REGURGITANT PEAK GRADIENT: 28 MMHG
EOSINOPHIL # BLD: 0.1 K/UL (ref 0–0.4)
EOSINOPHILS RELATIVE PERCENT: 2 % (ref 1–4)
ERYTHROCYTE [DISTWIDTH] IN BLOOD BY AUTOMATED COUNT: 13 % (ref 12.5–15.4)
GFR SERPL CREATININE-BSD FRML MDRD: >60 ML/MIN/1.73M2
GLUCOSE SERPL-MCNC: 90 MG/DL (ref 70–99)
HCT VFR BLD AUTO: 32.7 % (ref 36–46)
HGB BLD-MCNC: 11.3 G/DL (ref 12–16)
INR PPP: 1
LACTATE BLDV-SCNC: 1.1 MMOL/L (ref 0.5–2.2)
LYMPHOCYTES NFR BLD: 0.6 K/UL (ref 1–4.8)
LYMPHOCYTES RELATIVE PERCENT: 7 % (ref 24–44)
MCH RBC QN AUTO: 32.5 PG (ref 26–34)
MCHC RBC AUTO-ENTMCNC: 34.7 G/DL (ref 31–37)
MCV RBC AUTO: 93.6 FL (ref 80–100)
MONOCYTES NFR BLD: 0.5 K/UL (ref 0.1–1.2)
MONOCYTES NFR BLD: 6 % (ref 2–11)
NEUTROPHILS NFR BLD: 85 % (ref 36–66)
NEUTS SEG NFR BLD: 7.8 K/UL (ref 1.8–7.7)
PARTIAL THROMBOPLASTIN TIME: 39.9 SEC (ref 21.3–31.3)
PLATELET # BLD AUTO: 160 K/UL (ref 140–450)
PMV BLD AUTO: 7.6 FL (ref 6–12)
POTASSIUM SERPL-SCNC: 4 MMOL/L (ref 3.7–5.3)
PROT SERPL-MCNC: 7.2 G/DL (ref 6.4–8.3)
PROTHROMBIN TIME: 10.8 SEC (ref 9.4–12.6)
RBC # BLD AUTO: 3.49 M/UL (ref 4–5.2)
SODIUM SERPL-SCNC: 132 MMOL/L (ref 135–144)
TROPONIN I SERPL HS-MCNC: 11 NG/L (ref 0–14)
WBC OTHER # BLD: 9.1 K/UL (ref 3.5–11)

## 2023-08-09 PROCEDURE — 93306 TTE W/DOPPLER COMPLETE: CPT

## 2023-08-09 PROCEDURE — 85730 THROMBOPLASTIN TIME PARTIAL: CPT

## 2023-08-09 PROCEDURE — 70498 CT ANGIOGRAPHY NECK: CPT

## 2023-08-09 PROCEDURE — 2060000000 HC ICU INTERMEDIATE R&B

## 2023-08-09 PROCEDURE — 2580000003 HC RX 258: Performed by: EMERGENCY MEDICINE

## 2023-08-09 PROCEDURE — 6370000000 HC RX 637 (ALT 250 FOR IP): Performed by: STUDENT IN AN ORGANIZED HEALTH CARE EDUCATION/TRAINING PROGRAM

## 2023-08-09 PROCEDURE — 70450 CT HEAD/BRAIN W/O DYE: CPT

## 2023-08-09 PROCEDURE — 36415 COLL VENOUS BLD VENIPUNCTURE: CPT

## 2023-08-09 PROCEDURE — 80053 COMPREHEN METABOLIC PANEL: CPT

## 2023-08-09 PROCEDURE — 71045 X-RAY EXAM CHEST 1 VIEW: CPT

## 2023-08-09 PROCEDURE — 6360000004 HC RX CONTRAST MEDICATION: Performed by: EMERGENCY MEDICINE

## 2023-08-09 PROCEDURE — 93005 ELECTROCARDIOGRAM TRACING: CPT | Performed by: EMERGENCY MEDICINE

## 2023-08-09 PROCEDURE — 85025 COMPLETE CBC W/AUTO DIFF WBC: CPT

## 2023-08-09 PROCEDURE — 6370000000 HC RX 637 (ALT 250 FOR IP): Performed by: EMERGENCY MEDICINE

## 2023-08-09 PROCEDURE — 99223 1ST HOSP IP/OBS HIGH 75: CPT | Performed by: STUDENT IN AN ORGANIZED HEALTH CARE EDUCATION/TRAINING PROGRAM

## 2023-08-09 PROCEDURE — 93306 TTE W/DOPPLER COMPLETE: CPT | Performed by: INTERNAL MEDICINE

## 2023-08-09 PROCEDURE — 85610 PROTHROMBIN TIME: CPT

## 2023-08-09 PROCEDURE — 99285 EMERGENCY DEPT VISIT HI MDM: CPT

## 2023-08-09 PROCEDURE — 92523 SPEECH SOUND LANG COMPREHEN: CPT

## 2023-08-09 PROCEDURE — 84484 ASSAY OF TROPONIN QUANT: CPT

## 2023-08-09 PROCEDURE — 51702 INSERT TEMP BLADDER CATH: CPT

## 2023-08-09 PROCEDURE — 83605 ASSAY OF LACTIC ACID: CPT

## 2023-08-09 RX ORDER — CARVEDILOL 12.5 MG/1
12.5 TABLET ORAL 2 TIMES DAILY WITH MEALS
Status: DISCONTINUED | OUTPATIENT
Start: 2023-08-09 | End: 2023-08-12 | Stop reason: HOSPADM

## 2023-08-09 RX ORDER — LANOLIN ALCOHOL/MO/W.PET/CERES
325 CREAM (GRAM) TOPICAL
Status: DISCONTINUED | OUTPATIENT
Start: 2023-08-10 | End: 2023-08-12 | Stop reason: HOSPADM

## 2023-08-09 RX ORDER — CETIRIZINE HYDROCHLORIDE 10 MG/1
5 TABLET ORAL DAILY
Status: DISCONTINUED | OUTPATIENT
Start: 2023-08-09 | End: 2023-08-12 | Stop reason: HOSPADM

## 2023-08-09 RX ORDER — ACETAMINOPHEN 325 MG/1
650 TABLET ORAL EVERY 6 HOURS PRN
Status: DISCONTINUED | OUTPATIENT
Start: 2023-08-09 | End: 2023-08-12 | Stop reason: HOSPADM

## 2023-08-09 RX ORDER — CLOPIDOGREL BISULFATE 75 MG/1
300 TABLET ORAL ONCE
Status: DISCONTINUED | OUTPATIENT
Start: 2023-08-09 | End: 2023-08-09

## 2023-08-09 RX ORDER — SODIUM CHLORIDE 9 MG/ML
INJECTION, SOLUTION INTRAVENOUS PRN
Status: DISCONTINUED | OUTPATIENT
Start: 2023-08-09 | End: 2023-08-12 | Stop reason: HOSPADM

## 2023-08-09 RX ORDER — PHENOBARBITAL SODIUM 65 MG/ML
16.2 INJECTION INTRAMUSCULAR 2 TIMES DAILY
Status: DISCONTINUED | OUTPATIENT
Start: 2023-08-09 | End: 2023-08-12 | Stop reason: HOSPADM

## 2023-08-09 RX ORDER — ASPIRIN 81 MG/1
81 TABLET ORAL DAILY
Status: DISCONTINUED | OUTPATIENT
Start: 2023-08-09 | End: 2023-08-12 | Stop reason: HOSPADM

## 2023-08-09 RX ORDER — PRIMIDONE 50 MG/1
250 TABLET ORAL 2 TIMES DAILY
Status: DISCONTINUED | OUTPATIENT
Start: 2023-08-09 | End: 2023-08-12 | Stop reason: HOSPADM

## 2023-08-09 RX ORDER — TAMSULOSIN HYDROCHLORIDE 0.4 MG/1
0.4 CAPSULE ORAL DAILY
Status: DISCONTINUED | OUTPATIENT
Start: 2023-08-09 | End: 2023-08-12 | Stop reason: HOSPADM

## 2023-08-09 RX ORDER — SODIUM CHLORIDE 0.9 % (FLUSH) 0.9 %
5-40 SYRINGE (ML) INJECTION EVERY 12 HOURS SCHEDULED
Status: DISCONTINUED | OUTPATIENT
Start: 2023-08-09 | End: 2023-08-12 | Stop reason: HOSPADM

## 2023-08-09 RX ORDER — LISINOPRIL 10 MG/1
10 TABLET ORAL DAILY
Status: DISCONTINUED | OUTPATIENT
Start: 2023-08-09 | End: 2023-08-12 | Stop reason: HOSPADM

## 2023-08-09 RX ORDER — SODIUM CHLORIDE 0.9 % (FLUSH) 0.9 %
5-40 SYRINGE (ML) INJECTION PRN
Status: DISCONTINUED | OUTPATIENT
Start: 2023-08-09 | End: 2023-08-12 | Stop reason: HOSPADM

## 2023-08-09 RX ORDER — SODIUM CHLORIDE 0.9 % (FLUSH) 0.9 %
10 SYRINGE (ML) INJECTION ONCE
Status: COMPLETED | OUTPATIENT
Start: 2023-08-09 | End: 2023-08-09

## 2023-08-09 RX ORDER — ASPIRIN 81 MG/1
324 TABLET, CHEWABLE ORAL ONCE
Status: COMPLETED | OUTPATIENT
Start: 2023-08-09 | End: 2023-08-09

## 2023-08-09 RX ORDER — MONTELUKAST SODIUM 10 MG/1
10 TABLET ORAL NIGHTLY
Status: DISCONTINUED | OUTPATIENT
Start: 2023-08-09 | End: 2023-08-10

## 2023-08-09 RX ORDER — CLOPIDOGREL BISULFATE 75 MG/1
75 TABLET ORAL DAILY
Status: DISCONTINUED | OUTPATIENT
Start: 2023-08-09 | End: 2023-08-12 | Stop reason: HOSPADM

## 2023-08-09 RX ORDER — 0.9 % SODIUM CHLORIDE 0.9 %
1000 INTRAVENOUS SOLUTION INTRAVENOUS ONCE
Status: COMPLETED | OUTPATIENT
Start: 2023-08-09 | End: 2023-08-09

## 2023-08-09 RX ORDER — FAMOTIDINE 20 MG/1
20 TABLET, FILM COATED ORAL 2 TIMES DAILY
Status: DISCONTINUED | OUTPATIENT
Start: 2023-08-09 | End: 2023-08-12 | Stop reason: HOSPADM

## 2023-08-09 RX ORDER — 0.9 % SODIUM CHLORIDE 0.9 %
80 INTRAVENOUS SOLUTION INTRAVENOUS ONCE
Status: DISCONTINUED | OUTPATIENT
Start: 2023-08-09 | End: 2023-08-12 | Stop reason: HOSPADM

## 2023-08-09 RX ORDER — POLYETHYLENE GLYCOL 3350 17 G/17G
17 POWDER, FOR SOLUTION ORAL DAILY PRN
Status: DISCONTINUED | OUTPATIENT
Start: 2023-08-09 | End: 2023-08-12 | Stop reason: HOSPADM

## 2023-08-09 RX ORDER — LOPERAMIDE HYDROCHLORIDE 2 MG/1
2 CAPSULE ORAL PRN
Status: DISCONTINUED | OUTPATIENT
Start: 2023-08-09 | End: 2023-08-12 | Stop reason: HOSPADM

## 2023-08-09 RX ORDER — ENOXAPARIN SODIUM 100 MG/ML
40 INJECTION SUBCUTANEOUS DAILY
Status: DISCONTINUED | OUTPATIENT
Start: 2023-08-09 | End: 2023-08-12 | Stop reason: HOSPADM

## 2023-08-09 RX ORDER — ATORVASTATIN CALCIUM 40 MG/1
80 TABLET, FILM COATED ORAL NIGHTLY
Status: DISCONTINUED | OUTPATIENT
Start: 2023-08-09 | End: 2023-08-12 | Stop reason: HOSPADM

## 2023-08-09 RX ORDER — ONDANSETRON 4 MG/1
4 TABLET, ORALLY DISINTEGRATING ORAL EVERY 8 HOURS PRN
Status: DISCONTINUED | OUTPATIENT
Start: 2023-08-09 | End: 2023-08-12 | Stop reason: HOSPADM

## 2023-08-09 RX ORDER — PHENOBARBITAL 32.4 MG/1
16.2 TABLET ORAL 2 TIMES DAILY
Status: DISCONTINUED | OUTPATIENT
Start: 2023-08-09 | End: 2023-08-09

## 2023-08-09 RX ORDER — CLONIDINE HYDROCHLORIDE 0.1 MG/1
0.1 TABLET ORAL 2 TIMES DAILY
Status: DISCONTINUED | OUTPATIENT
Start: 2023-08-09 | End: 2023-08-10

## 2023-08-09 RX ORDER — LORAZEPAM 1 MG/1
1 TABLET ORAL NIGHTLY PRN
Status: DISCONTINUED | OUTPATIENT
Start: 2023-08-09 | End: 2023-08-12 | Stop reason: HOSPADM

## 2023-08-09 RX ORDER — LEVETIRACETAM 500 MG/1
500 TABLET ORAL 2 TIMES DAILY
Status: DISCONTINUED | OUTPATIENT
Start: 2023-08-09 | End: 2023-08-10

## 2023-08-09 RX ORDER — ONDANSETRON 2 MG/ML
4 INJECTION INTRAMUSCULAR; INTRAVENOUS EVERY 6 HOURS PRN
Status: DISCONTINUED | OUTPATIENT
Start: 2023-08-09 | End: 2023-08-12 | Stop reason: HOSPADM

## 2023-08-09 RX ADMIN — TAMSULOSIN HYDROCHLORIDE 0.4 MG: 0.4 CAPSULE ORAL at 17:57

## 2023-08-09 RX ADMIN — FAMOTIDINE 20 MG: 20 TABLET ORAL at 21:21

## 2023-08-09 RX ADMIN — IOPAMIDOL 100 ML: 755 INJECTION, SOLUTION INTRAVENOUS at 11:42

## 2023-08-09 RX ADMIN — ASPIRIN 81 MG 324 MG: 81 TABLET ORAL at 12:31

## 2023-08-09 RX ADMIN — LORAZEPAM 1 MG: 1 TABLET ORAL at 21:21

## 2023-08-09 RX ADMIN — PRIMIDONE 250 MG: 50 TABLET ORAL at 17:56

## 2023-08-09 RX ADMIN — SODIUM CHLORIDE 1000 ML: 9 INJECTION, SOLUTION INTRAVENOUS at 17:40

## 2023-08-09 RX ADMIN — CLONIDINE HYDROCHLORIDE 0.1 MG: 0.1 TABLET ORAL at 18:01

## 2023-08-09 RX ADMIN — CLOPIDOGREL BISULFATE 75 MG: 75 TABLET ORAL at 12:32

## 2023-08-09 RX ADMIN — LEVETIRACETAM 500 MG: 500 TABLET, FILM COATED ORAL at 17:56

## 2023-08-09 RX ADMIN — SODIUM CHLORIDE, PRESERVATIVE FREE 10 ML: 5 INJECTION INTRAVENOUS at 11:42

## 2023-08-09 RX ADMIN — Medication 100 ML: at 11:44

## 2023-08-09 RX ADMIN — CARVEDILOL 12.5 MG: 12.5 TABLET, FILM COATED ORAL at 17:57

## 2023-08-09 ASSESSMENT — ENCOUNTER SYMPTOMS
WHEEZING: 0
VOMITING: 0
DIARRHEA: 0
SORE THROAT: 0
COUGH: 0
BACK PAIN: 0
NAUSEA: 0
ABDOMINAL PAIN: 0
SHORTNESS OF BREATH: 0

## 2023-08-09 ASSESSMENT — PAIN SCALES - GENERAL
PAINLEVEL_OUTOF10: 0
PAINLEVEL_OUTOF10: 0

## 2023-08-09 ASSESSMENT — LIFESTYLE VARIABLES: SMOKING_STATUS: 0

## 2023-08-09 NOTE — FLOWSHEET NOTE
Bedside swallow study complete patient able to swallow liquids without difficulty and able to tolerate solids without any issues noted.

## 2023-08-09 NOTE — ED PROVIDER NOTES
[address] [facility] Surg ICU  [address] [facility]. [address] [facility]  Phone: [phone]    eMERGENCY dEPARTMENT eNCOUnter        Pt Name: Yonatan Ferrari  MRN: [id number]  [address] 1945  Date of evaluation: 8/9/23    CHIEF COMPLAINT     Chief Complaint   Patient presents with    Extremity Weakness       HISTORY OF PRESENT ILLNESS    Yonatan Ferrari is a 69 y.o. female who presents to the emergency department from preop where she was supposed to have a colonoscopy routine check per colorectal surgeon Dr. Tania Mcbride. Patient was noted to have slurred speech and explained that her legs were weak. Her sister is at the bedside and stated the last time she saw her was last week Thursday but the patient admitted she woke up Saturday morning with slurred speech and since then her legs have felt like a rubber. She has tried to ambulate with falls. REVIEW OF SYSTEMS     Review of Systems   Constitutional:  Negative for chills and fever. HENT:  Negative for congestion, ear pain and sore throat. Respiratory:  Negative for cough, shortness of breath and wheezing. Cardiovascular:  Negative for chest pain, palpitations and leg swelling. Gastrointestinal:  Negative for abdominal pain, diarrhea, nausea and vomiting. Genitourinary:  Negative for dysuria, flank pain, frequency and hematuria. Musculoskeletal:  Negative for back pain. Skin:  Negative for rash. Neurological:  Positive for speech difficulty. Negative for dizziness, light-headedness, numbness and headaches. PAST MEDICAL HISTORY    has a past medical history of Arthritis, Cancer ([address] [facility]), Hypertension, Pelvic fracture ([facility]), and Seizures ([facility]). SURGICAL HISTORY      has a past surgical history that includes Cholecystectomy; Tonsillectomy; joint replacement (2014); Colonoscopy; and hemicolectomy (11/2022).     CURRENT MEDICATIONS       Current Discharge Medication List        CONTINUE these medications which have NOT CHANGED 30.2 (L) 36 - 46 %    MCV 94.4 80 - 100 fL    MCH 32.8 26 - 34 pg    MCHC 34.7 31 - 37 g/dL    RDW 12.8 12.5 - 15.4 %    Platelets 295 268 - 260 k/uL    MPV 7.4 6.0 - 12.0 fL   EKG 12 Lead   Result Value Ref Range    Ventricular Rate 78 BPM    Atrial Rate 78 BPM    P-R Interval 156 ms    QRS Duration 94 ms    Q-T Interval 412 ms    QTc Calculation (Bazett) 469 ms    P Axis 59 degrees    R Axis 4 degrees    T Axis 41 degrees   Transthoracic echocardiogram (TTE) complete with contrast, bubble, strain, and 3D PRN   Result Value Ref Range    LV EDV A2C 62 mL    LV EDV A4C 74 mL    LV ESV A2C 24 mL    LV ESV A4C 31 mL    IVSd 1.5 (A) 0.6 - 0.9 cm    LVIDd 4.2 3.9 - 5.3 cm    LVIDs 3.0 cm    LVOT Diameter 1.9 cm    LVOT Mean Gradient 2 mmHg    LVOT VTI 20.9 cm    LVOT Peak Velocity 1.0 m/s    LVOT Peak Gradient 4 mmHg    LVPWd 1.1 (A) 0.6 - 0.9 cm    LV E' Lateral Velocity 9 cm/s    LV E' Septal Velocity 5 cm/s    LV Ejection Fraction A2C 61 %    LV Ejection Fraction A4C 58 %    EF BP 60 55 - 100 %    LVOT Area 2.8 cm2    LVOT SV 59.2 ml    LA Minor Axis 4.4 cm    LA Major [place] 5.2 cm    LA Area 2C 17.1 cm2    LA Area 4C 20.5 cm2    LA Volume 2C 53 (A) 22 - 52 mL    LA Volume 4C 60 (A) 22 - 52 mL    LA Volume BP 61 (A) 22 - 52 mL    LA Diameter 4.1 cm    AV Mean Gradient 2 mmHg    AV VTI 25.4 cm    AV Mean Velocity 0.7 m/s    AV Peak Velocity 1.2 m/s    AV Peak Gradient 5 mmHg    AV Area by VTI 2.3 cm2    AV Area by Peak Velocity 2.5 cm2    Aortic Root 3.7 cm    IVC Proxmal 1.6 cm    MV E Wave Deceleration Time 257.0 ms    MV A Velocity 0.64 m/s    MV E Velocity 0.49 m/s    MV Mean Gradient 1 mmHg    MV VTI 17.5 cm    MV Mean Velocity 0.4 m/s    MV Max Velocity 0.8 m/s    MV Peak Gradient 3 mmHg    MV Area by VTI 3.4 cm2    PV Max Velocity 0.9 m/s    PV Peak Gradient 3 mmHg    Est. RA Pressure 5 mmHg    RV Free Wall Peak S' 15 cm/s    TR Max Velocity 2.63 m/s    TR Peak Gradient 28 mmHg    Body Surface Area 1.87 m2

## 2023-08-09 NOTE — H&P
[facility]  Office: [phone]  Jellygladys Alegria, DO, Becki Stevens, DO, Mary Singh, DO, Saad Gerardo, DO, Andres Simpson MD, Solitario Zacarias MD, Toshia Acharya MD, Lara Jaramillo MD,  Jayme Deshpande MD, Andrew Branch MD, Dwayne Lamp, DO, Lopez Campbell MD,  Cyndy Yuan MD, Liset Obando MD, Ray Bell DO, Polina Romero MD,  El Perrin DO, Jorden Barlow MD, Rupa Parra MD, Philippe Thompson MD, Ana Luisa Edwards MD,  Russell Swartz MD, Roberto Carlos Villanueva MD, Livia Fink MD, Eryn Muse DO, Gage Magallanes MD,  Joanie Askew MD, Radha Purcell, CNP,  Maureen Pearl, CNP, Aristeo Hanna, CNP, Milton Felipe, CNP,  Chiara Viramontes, DNP, Medina Becerra, CNP, Kieran Saldivar, CNP, Cindy Carrillo, CNP, Britt Bergeron, CNP, Jose Elias Jaime, CNP, Emilio Malagon PA-C, Bk Harkins, PATRICIA, Jeff Leonard, CNP, Violetta Wallace, CNP         [facility]    HISTORY AND PHYSICAL EXAMINATION            Date:   8/9/2023  Patient name:  Sara Argueta  Date of admission:  8/9/2023 10:04 AM  MRN:   [id number]  Account:  [de-identified]  YOB: 1945  PCP:    Peter Boyd DO  Room:   339/339-01  Code Status:    Full Code      History Obtained From:     patient    History of Present Illness:     Sara Argueta is a 69 y.o. Non- / non  female who presents with Extremity Weakness   and is admitted to the hospital for the management of CVA (cerebrovascular accident due to intracerebral hemorrhage) ([facility]). 67-year-old female with a past medical history of epilepsy on antiseizure meds, hypertension, and iron deficiency anemia presents to the emergency department for bilateral lower extremity weakness and slurred speech. Patient was due to have a colonoscopy today where staff noticed her unable to stand up with slight slurred speech.   In the ED CTA and head CT were completed where possible lacunar infarcts were identified. Patient has not been taking her aspirin for 5 days in preparation for her colonoscopy but has been compliant with her medications and her outpatient follow-ups. Patient follows Dr. Vish Aj at [facility] for her epilepsy for neurology. Patient denies nausea, vomiting, diarrhea, shortness of breath, cough, headache, or chest pain. Patient does endorse weakness and difficulty ambulating. Patient lives at assisted living facility at [place]. Past Medical History:     Past Medical History:   Diagnosis Date    Arthritis     Cancer ([address] [facility])     colon    Hypertension     Pelvic fracture ([address] [facility]) 2019    right    Seizures ([address])         Past Surgical History:     Past Surgical History:   Procedure Laterality Date    CHOLECYSTECTOMY      COLONOSCOPY      HEMICOLECTOMY  11/2022    Rt    JOINT REPLACEMENT  2014    left knee    TONSILLECTOMY          Medications Prior to Admission:     Prior to Admission medications    Medication Sig Start Date End Date Taking?  Authorizing Provider   Sodium Sulfate-Mag Sulfate-KCl (SUTAB) 4645-894-483 MG TABS Follow bowel prep instructions 8/3/23   Clark Oamlley MD   acetaminophen (TYLENOL) 325 MG tablet Take 2 tablets by mouth every 6 hours as needed for Pain    Historical Provider, MD   aspirin 81 MG EC tablet Take 1 tablet by mouth daily    Historical Provider, MD   carvedilol (COREG) 12.5 MG tablet Take 1 tablet by mouth 2 times daily (with meals)    Historical Provider, MD   cloNIDine (CATAPRES) 0.1 MG tablet Take 1 tablet by mouth 2 times daily    Historical Provider, MD   famotidine (PEPCID) 20 MG tablet Take 1 tablet by mouth 2 times daily    Historical Provider, MD   ferrous sulfate (IRON 325) 325 (65 Fe) MG tablet Take 1 tablet by mouth daily (with breakfast)    Historical Provider, MD   tamsulosin (FLOMAX) 0.4 MG capsule Take 1 capsule by mouth daily    Historical Provider, MD   cephALEXin (KEFLEX) 500 MG capsule Take 1 capsule by mouth as needed    Historical

## 2023-08-09 NOTE — PROGRESS NOTES
SPEECH LANGUAGE PATHOLOGY  Facility/Department: [facility] ICU  Initial Speech/Language/Cognitive Assessment    NAME: Deya Weiss  : 1945   MRN: [id number]  ADMISSION DATE: 2023  ADMITTING DIAGNOSIS: has Abnormal ECG; Essential hypertension; Heart murmur; and CVA (cerebrovascular accident due to intracerebral hemorrhage) ([address]) on their problem list.      Date of Eval: 2023   Evaluating Therapist: NIDA Liu    RECENT RESULTS  CT OF HEAD: 2023  IMPRESSION:  1. Patient motion partially limits evaluation. 2. No convincing acute intracranial abnormality. 3. Minimal global parenchymal volume loss with minimal chronic microvascular  ischemic changes. Primary Complaint:   Deya Weiss is a 69 y.o. Non- / non  female who presents with Extremity Weakness   and is admitted to the hospital for the management of CVA (cerebrovascular accident due to intracerebral hemorrhage) ([address] 73-year-old female with a past medical history of epilepsy on antiseizure meds, hypertension, and iron deficiency anemia presents to the emergency department for bilateral lower extremity weakness and slurred speech. Patient was due to have a colonoscopy today where staff noticed her unable to stand up with slight slurred speech. In the ED CTA and head CT were completed where possible lacunar infarcts were identified. Patient has not been taking her aspirin for 5 days in preparation for her colonoscopy but has been compliant with her medications and her outpatient follow-ups. Patient follows Dr. Ayesha Perez at [facility] for her epilepsy for neurology. Patient denies nausea, vomiting, diarrhea, shortness of breath, cough, headache, or chest pain. Patient does endorse weakness and difficulty ambulating. Patient lives at assisted living facility at [place].     Pain:  Pain Assessment  Pain Assessment: 0-10  Pain Level: 0  Patient's Stated Pain Goal: 0 - No pain    Assessment:

## 2023-08-09 NOTE — ED TRIAGE NOTES
Patient to emergency department with complaints of bilateral leg weakness ongoing since the weekend. Pt was brought from [place] via private car for a colonoscopy. Upon arrival to the pre-op the pt discloses that she has been having increasing weakness in both of her legs. Sister reports possible slurred speech. Pt has possible dislocated right knee, hx of multiple dislocations.  Bilateral upper extremities equal in strength

## 2023-08-10 ENCOUNTER — APPOINTMENT (OUTPATIENT)
Dept: MRI IMAGING | Age: 78
End: 2023-08-10
Payer: MEDICARE

## 2023-08-10 PROBLEM — I63.81 LACUNAR INFARCT, ACUTE (HCC): Status: ACTIVE | Noted: 2023-08-10

## 2023-08-10 LAB
CHOLEST SERPL-MCNC: 159 MG/DL
CHOLESTEROL/HDL RATIO: 2.4
ERYTHROCYTE [DISTWIDTH] IN BLOOD BY AUTOMATED COUNT: 12.8 % (ref 12.5–15.4)
EST. AVERAGE GLUCOSE BLD GHB EST-MCNC: 91 MG/DL
HBA1C MFR BLD: 4.8 % (ref 4–6)
HCT VFR BLD AUTO: 30.2 % (ref 36–46)
HDLC SERPL-MCNC: 66 MG/DL
HGB BLD-MCNC: 10.5 G/DL (ref 12–16)
LDLC SERPL CALC-MCNC: 76 MG/DL (ref 0–130)
MCH RBC QN AUTO: 32.8 PG (ref 26–34)
MCHC RBC AUTO-ENTMCNC: 34.7 G/DL (ref 31–37)
MCV RBC AUTO: 94.4 FL (ref 80–100)
PHENOBARBITAL: 31.8 UG/ML (ref 15–40)
PLATELET # BLD AUTO: 149 K/UL (ref 140–450)
PMV BLD AUTO: 7.4 FL (ref 6–12)
RBC # BLD AUTO: 3.2 M/UL (ref 4–5.2)
TRIGL SERPL-MCNC: 84 MG/DL
WBC OTHER # BLD: 8.4 K/UL (ref 3.5–11)

## 2023-08-10 PROCEDURE — 70551 MRI BRAIN STEM W/O DYE: CPT

## 2023-08-10 PROCEDURE — 99232 SBSQ HOSP IP/OBS MODERATE 35: CPT | Performed by: STUDENT IN AN ORGANIZED HEALTH CARE EDUCATION/TRAINING PROGRAM

## 2023-08-10 PROCEDURE — 80184 ASSAY OF PHENOBARBITAL: CPT

## 2023-08-10 PROCEDURE — 97530 THERAPEUTIC ACTIVITIES: CPT

## 2023-08-10 PROCEDURE — 80061 LIPID PANEL: CPT

## 2023-08-10 PROCEDURE — 2060000000 HC ICU INTERMEDIATE R&B

## 2023-08-10 PROCEDURE — 6370000000 HC RX 637 (ALT 250 FOR IP): Performed by: STUDENT IN AN ORGANIZED HEALTH CARE EDUCATION/TRAINING PROGRAM

## 2023-08-10 PROCEDURE — 85027 COMPLETE CBC AUTOMATED: CPT

## 2023-08-10 PROCEDURE — 83036 HEMOGLOBIN GLYCOSYLATED A1C: CPT

## 2023-08-10 PROCEDURE — 2580000003 HC RX 258: Performed by: STUDENT IN AN ORGANIZED HEALTH CARE EDUCATION/TRAINING PROGRAM

## 2023-08-10 PROCEDURE — 6360000002 HC RX W HCPCS: Performed by: PSYCHIATRY & NEUROLOGY

## 2023-08-10 PROCEDURE — 97166 OT EVAL MOD COMPLEX 45 MIN: CPT

## 2023-08-10 PROCEDURE — 36415 COLL VENOUS BLD VENIPUNCTURE: CPT

## 2023-08-10 PROCEDURE — 97535 SELF CARE MNGMENT TRAINING: CPT

## 2023-08-10 PROCEDURE — 97162 PT EVAL MOD COMPLEX 30 MIN: CPT

## 2023-08-10 PROCEDURE — 99222 1ST HOSP IP/OBS MODERATE 55: CPT | Performed by: PSYCHIATRY & NEUROLOGY

## 2023-08-10 RX ORDER — MONTELUKAST SODIUM 10 MG/1
10 TABLET ORAL DAILY
Status: DISCONTINUED | OUTPATIENT
Start: 2023-08-10 | End: 2023-08-12 | Stop reason: HOSPADM

## 2023-08-10 RX ORDER — LEVETIRACETAM 500 MG/1
1000 TABLET ORAL 2 TIMES DAILY
COMMUNITY

## 2023-08-10 RX ORDER — CLONIDINE HYDROCHLORIDE 0.1 MG/1
0.1 TABLET ORAL EVERY 12 HOURS PRN
Status: DISCONTINUED | OUTPATIENT
Start: 2023-08-10 | End: 2023-08-12 | Stop reason: HOSPADM

## 2023-08-10 RX ORDER — LEVETIRACETAM 500 MG/1
1000 TABLET ORAL ONCE
Status: COMPLETED | OUTPATIENT
Start: 2023-08-10 | End: 2023-08-10

## 2023-08-10 RX ORDER — LEVETIRACETAM 500 MG/1
1500 TABLET ORAL 2 TIMES DAILY WITH MEALS
Status: DISCONTINUED | OUTPATIENT
Start: 2023-08-10 | End: 2023-08-12 | Stop reason: HOSPADM

## 2023-08-10 RX ADMIN — MONTELUKAST 10 MG: 10 TABLET, FILM COATED ORAL at 09:03

## 2023-08-10 RX ADMIN — PHENOBARBITAL SODIUM 16.2 MG: 65 INJECTION INTRAMUSCULAR; INTRAVENOUS at 09:01

## 2023-08-10 RX ADMIN — LISINOPRIL 10 MG: 10 TABLET ORAL at 17:28

## 2023-08-10 RX ADMIN — PRIMIDONE 250 MG: 50 TABLET ORAL at 17:20

## 2023-08-10 RX ADMIN — LEVETIRACETAM 1000 MG: 500 TABLET, FILM COATED ORAL at 09:04

## 2023-08-10 RX ADMIN — FAMOTIDINE 20 MG: 20 TABLET ORAL at 08:35

## 2023-08-10 RX ADMIN — PHENOBARBITAL SODIUM 16.2 MG: 65 INJECTION INTRAMUSCULAR; INTRAVENOUS at 00:59

## 2023-08-10 RX ADMIN — PHENOBARBITAL SODIUM 16.2 MG: 65 INJECTION INTRAMUSCULAR; INTRAVENOUS at 20:17

## 2023-08-10 RX ADMIN — CETIRIZINE HYDROCHLORIDE 5 MG: 10 TABLET, FILM COATED ORAL at 08:36

## 2023-08-10 RX ADMIN — LEVETIRACETAM 1500 MG: 500 TABLET, FILM COATED ORAL at 17:30

## 2023-08-10 RX ADMIN — LEVETIRACETAM 500 MG: 500 TABLET, FILM COATED ORAL at 08:35

## 2023-08-10 RX ADMIN — SODIUM CHLORIDE, PRESERVATIVE FREE 10 ML: 5 INJECTION INTRAVENOUS at 20:18

## 2023-08-10 RX ADMIN — PRIMIDONE 250 MG: 50 TABLET ORAL at 08:36

## 2023-08-10 RX ADMIN — CLOPIDOGREL BISULFATE 75 MG: 75 TABLET ORAL at 08:36

## 2023-08-10 RX ADMIN — FAMOTIDINE 20 MG: 20 TABLET ORAL at 20:17

## 2023-08-10 RX ADMIN — SODIUM CHLORIDE, PRESERVATIVE FREE 10 ML: 5 INJECTION INTRAVENOUS at 08:37

## 2023-08-10 RX ADMIN — LORAZEPAM 1 MG: 1 TABLET ORAL at 20:18

## 2023-08-10 RX ADMIN — ASPIRIN 81 MG: 81 TABLET, COATED ORAL at 08:36

## 2023-08-10 RX ADMIN — TAMSULOSIN HYDROCHLORIDE 0.4 MG: 0.4 CAPSULE ORAL at 08:36

## 2023-08-10 RX ADMIN — FERROUS SULFATE TAB EC 325 MG (65 MG FE EQUIVALENT) 325 MG: 325 (65 FE) TABLET DELAYED RESPONSE at 08:36

## 2023-08-10 ASSESSMENT — PAIN SCALES - GENERAL
PAINLEVEL_OUTOF10: 0

## 2023-08-10 NOTE — PLAN OF CARE
Problem: Discharge Planning  Goal: Discharge to home or other facility with appropriate resources  8/10/2023 1738 by Yanet Gonzalez RN  Outcome: Progressing  8/10/2023 0415 by Akanksha Pandey RN  Outcome: Progressing     Problem: Pain  Goal: Verbalizes/displays adequate comfort level or baseline comfort level  8/10/2023 1738 by Yanet Gonzalez RN  Outcome: Progressing  8/10/2023 0415 by Akanksha Pandey RN  Outcome: Progressing     Problem: Skin/Tissue Integrity  Goal: Absence of new skin breakdown  Description: 1. Monitor for areas of redness and/or skin breakdown  2. Assess vascular access sites hourly  3. Every 4-6 hours minimum:  Change oxygen saturation probe site  4. Every 4-6 hours:  If on nasal continuous positive airway pressure, respiratory therapy assess nares and determine need for appliance change or resting period.   8/10/2023 1738 by Yanet Gonzalez RN  Outcome: Progressing  8/10/2023 0415 by Akanksha Pandey RN  Outcome: Progressing     Problem: ABCDS Injury Assessment  Goal: Absence of physical injury  8/10/2023 1738 by Yanet Gonzalez RN  Outcome: Progressing  Flowsheets (Taken 8/10/2023 1508)  Absence of Physical Injury: Implement safety measures based on patient assessment  8/10/2023 0415 by Akanksha Pandey RN  Outcome: Progressing     Problem: Safety - Adult  Goal: Free from fall injury  8/10/2023 1738 by Yanet Gonzalez RN  Outcome: Progressing  Flowsheets (Taken 8/10/2023 1508)  Free From Fall Injury: Instruct family/caregiver on patient safety  8/10/2023 0415 by Akanksha Pandey RN  Outcome: Progressing     Problem: Chronic Conditions and Co-morbidities  Goal: Patient's chronic conditions and co-morbidity symptoms are monitored and maintained or improved  Outcome: Progressing

## 2023-08-10 NOTE — PROGRESS NOTES
Physical Therapy  Facility/Department: [facility] SURG ICU  Physical Therapy Initial Assessment    Name: Mert Pedroza  : 1945  MRN: [id number]  Date of Service: 8/10/2023    Chief Complaint   Patient presents with    Extremity Weakness       Discharge Recommendations: Further therapy recommended at discharge. PT Equipment Recommendations  Equipment Needed: No      Patient Diagnosis(es): The encounter diagnosis was Lacunar infarct, acute ([address]). Past Medical History:  has a past medical history of Arthritis, Cancer ([address]), Hypertension, Pelvic fracture ([address]), and Seizures ([address]). Past Surgical History:  has a past surgical history that includes Cholecystectomy; Tonsillectomy; joint replacement (); Colonoscopy; and hemicolectomy (2022). Assessment   Body Structures, Functions, Activity Limitations Requiring Skilled Therapeutic Intervention: Decreased functional mobility ; Decreased endurance;Decreased strength;Decreased posture;Decreased balance;Decreased cognition  Assessment: The pt required Mod A for bed mobility and Mod A x2 for sit to stand transfer, unable to ambulate this date due to significant RLE weakness and poor standing balance. Recommend continued therapy to address deficits and progress toward prior level of independence. The pt currently requires 24hr physical assistance for functional mobility due to high risk of falls.   Therapy Prognosis: Fair  Decision Making: Medium Complexity  Requires PT Follow-Up: Yes  Activity Tolerance  Activity Tolerance: Patient limited by endurance     Plan   Physcial Therapy Plan  General Plan:  (5-6x/wk)  Current Treatment Recommendations: Strengthening, ROM, Balance training, Functional mobility training, Transfer training, Endurance training, Wheelchair mobility training, Gait training, Safety education & training, Patient/Caregiver education & training, Therapeutic activities  Safety Devices  Type of Devices: Call light within reach,

## 2023-08-10 NOTE — CONSULTS
[facility]   IN-PATIENT SERVICE      NEUROLOGY CONSULT  NOTE            Date:   8/10/2023  Patient name:  Jackson Tatum  Date of admission:  8/9/2023  YOB: 1945      Chief Complaint:     Chief Complaint   Patient presents with    Extremity Weakness       Reason for Consult:      Weakness, CVA    History of Present Illness: The patient is a 69 y.o. female who presents with Extremity Weakness  . The patient was seen and examined and the chart was reviewed. This is a 69year old female who presented to the ED when staff at her colonoscopy note she was unable to stand for the procedure and had slurred speech. She has a history of seizures, HTN and anemia. Patient has not been taking her aspirin for 5 days in preparation for her colonoscopy but has been compliant with her medications and her outpatient follow-ups. Patient follows Dr. Eugenia Rodrigez at [facility] for her epilepsy for neurology. 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 Past Medical History:     Past Medical History:   Diagnosis Date    Arthritis     Cancer ([address])     colon    Hypertension     Pelvic fracture ([address]) 2019    right    Seizures ([address])         Past Surgical History:     Past Surgical History:   Procedure Laterality Date    CHOLECYSTECTOMY      COLONOSCOPY      HEMICOLECTOMY  11/2022    Rt    JOINT REPLACEMENT  2014    left knee    TONSILLECTOMY          Medications Prior to Admission:     Prior to Admission medications    Medication Sig Start Date End Date Taking?  Authorizing Provider   levETIRAcetam (KEPPRA) 500 MG tablet Take 2 tablets by mouth 2 times daily   Yes Historical Provider, MD   Sodium Sulfate-Mag Sulfate-KCl (SUTAB) 4342-959-433 MG TABS Follow bowel prep instructions 8/3/23   Alina Boyle MD   acetaminophen (TYLENOL) 325 MG tablet Take 2 tablets by mouth every 6 hours as needed for Pain    Historical

## 2023-08-10 NOTE — PROGRESS NOTES
[address] [facility]  PROGRESS NOTE    Room # 339/339-01   Name: Brock Hernandez               Reason for visit: Routine    I visited the patient. Admit Date & Time: 8/9/2023 10:04 AM    Assessment:  Brock Hernandez is a 69 y.o. female in the hospital because \"CVA\". Upon entering the room patient was sleeping       Intervention: This writer offered brief silent prayer for pt     Outcome:  Patient remained sleeping     Plan:  Chaplains will remain available to offer spiritual and emotional support as needed.     Electronically signed by Zahraa Turpin on 8/10/2023 at 1:45 PM.  [address] [facility] [address] -        08/10/23 1344   Encounter Summary   Service Provided For: Patient   Referral/Consult From: Doug   Last Encounter  08/10/23   Complexity of Encounter Low   Begin Time 1325   End Time  1327   Total Time Calculated 2 min   Assessment/Intervention/Outcome   Assessment Unable to assess  (pt sleeping)   Intervention Prayer (assurance of)/[place]   Outcome Did not respond   Plan and Referrals   Plan/Referrals Continue to visit, (comment)

## 2023-08-10 NOTE — CARE COORDINATION
Case Management Assessment  Initial Evaluation    Date/Time of Evaluation: 8/10/2023 1:15 PM  Assessment Completed by: Leni Guerrero RN    If patient is discharged prior to next notation, then this note serves as note for discharge by case management. Patient Name: Lilian Chauhan                   YOB: 1945  Diagnosis: CVA (cerebrovascular accident due to intracerebral hemorrhage) [facility]) [I61.9]  Lacunar infarct, acute ([address]) [I63.81]                   Date / Time: 8/9/2023 10:04 AM    Patient Admission Status: Inpatient   Readmission Risk (Low < 19, Mod (19-27), High > 27): Readmission Risk Score: 16.6    Current PCP: Roxie Mejia, DO  PCP verified by CM? Chart Reviewed: Yes      History Provided by: Patient  Patient Orientation: Alert and Oriented    Patient Cognition: Alert    Hospitalization in the last 30 days (Readmission):  No    If yes, Readmission Assessment in CM Navigator will be completed. Advance Directives:      Code Status: DNR-CCA   Patient's Primary Decision Maker is:  María Gonsales)      Discharge Planning:    Patient lives with: Alone Type of Home: Assisted living  Primary Care Giver: Self  Patient Support Systems include: Family Members   Current Financial resources:    Current community resources:    Current services prior to admission: None            Current DME:              Type of Home Care services:  [employer], Nursing Services    ADLS  Prior functional level: Assistance with the following:  Current functional level: Assistance with the following:    PT AM-PAC: 10 /24  OT AM-PAC:   /24    Family can provide assistance at DC: Would you like Case Management to discuss the discharge plan with any other family members/significant others, and if so, who?     Plans to Return to Present Housing: Yes  Other Identified Issues/Barriers to RETURNING to current housing:   Potential Assistance needed at discharge: N/A            Potential DME:    Patient expects to discharge to: Assisted living  Plan for transportation at discharge: Family    Financial    Payor: MEDICARE / Plan: MEDICARE PART A AND B / Product Type: *No Product type* /     Does insurance require precert for SNF: No    Potential assistance Purchasing Medications:    Meds-to-Beds request: No      [address] [phone] - [address] [place] [address]  Phone: [phone] Fax: [phone]      Notes:    Factors facilitating achievement of predicted outcomes:     Barriers to discharge: Additional Case Management Notes: return to [facility] if d/c prior to Medicare 3 night qualifying stay- if admitted 3 nights would like to d/c to [address] [place] [address] for Transition of Care is related to the following treatment goals of CVA (cerebrovascular accident due to intracerebral hemorrhage) ([address]) [I61.9]  Lacunar infarct, acute ([address]) [H05.43]    IF APPLICABLE: The Patient and/or patient representative Mary Campos and her family were provided with a choice of provider and agrees with the discharge plan. Freedom of choice list with basic dialogue that supports the patient's individualized plan of care/goals and shares the quality data associated with the providers was provided to: Patient   Patient Representative Name:       The Patient and/or Patient Representative Agree with the Discharge Plan?  Yes    Jay Loredo RN  Case Management Department  Ph: [phone] Fax: [phone]

## 2023-08-10 NOTE — FLOWSHEET NOTE
Patient refused to have argueta removed. Writer attempted several times and offered other alternative options and continues to refuse having argueta removed. Notified provider.

## 2023-08-10 NOTE — PROGRESS NOTES
Occupational Therapy  Facility/Department: [address] [facility] ICU  Occupational Therapy Initial Assessment      Name: Deya Weiss  : 1945  MRN: [id number]  Date of Service: 8/10/2023    Discharge Recommendations:  Patient would benefit from continued therapy after discharge, 24 hour supervision or assist  OT Equipment Recommendations  Other: AE // DME recommendations are TBD. Patient Diagnosis(es): The encounter diagnosis was Lacunar infarct, acute ([address]). Past Medical History:  has a past medical history of Arthritis, Cancer ([address]), Hypertension, Pelvic fracture ([address]), and Seizures ([address]). Past Surgical History:  has a past surgical history that includes Cholecystectomy; Tonsillectomy; joint replacement (); Colonoscopy; and hemicolectomy (2022). Assessment   Performance deficits / Impairments: Decreased functional mobility ; Decreased endurance;Decreased coordination;Decreased cognition;Decreased safe awareness;Decreased strength;Decreased ADL status; Decreased balance;Decreased vision/visual deficit; Decreased fine motor control  Assessment: Pt has deficits at this time with her ability to independently / safely complete daily tasks, balance and mobility, endurance, and RUE Weakness. She is also demonstrating impaired // decreased cognition, as evidenced by poor safety awareness, decreased insight to deficits. At this time, the Pt has decreased ability to return to [facility] and prior living situation; secondary to her functional deficits will require 24 hours supervision and assist upon DC. Pt will also benefit from continuation of OT servies (Both acute and Post-acute) to improve functional activity participation and regain independence.   Prognosis: Fair  Decision Making: Medium Complexity  REQUIRES OT FOLLOW-UP: Yes  Activity Tolerance  Activity Tolerance: Patient limited by fatigue;Patient limited by pain;Treatment limited secondary to decreased cognition          Plan   Occupational

## 2023-08-10 NOTE — PROGRESS NOTES
[facility]  Office: 7900  1826, DO, Becki Stevens, DO, Mary Singh, DO, Saad Gerardo, DO, Andres Simpson MD, Solitario Zacarias MD, Toshia Acharya MD, aLra Jaramillo MD,  Jayme Deshpande MD, Andrew Branch MD, Dwayne Ariza, DO, Lopez Campbell MD,  Cyndy Yuan MD, Liset Obando MD, Ray Bell DO, Polina Romero MD,  El Perrin DO, Jorden Barlow MD, Rupa Parra MD, Philippe Thompson MD, Ana Luisa Edwards MD,  Russell Swartz MD, Roberto Carlos Villanueva MD, Livia Fink MD, Eryn Muse DO, Gage Magallanes MD,  Joanie Askew MD, Radha Purcell, CNP,  Maureen Pearl, CNP, Aristeo Hanna, CNP, Milton Felipe, CNP,  Chiara Viramontes, RICKEY, Medina Becerra, CNP, Kieran Saldivar, CNP, Cindy Carrillo, CNP, Britt Bergeron, CNP, Jose Elias Jaime, CNP, Emilio Malagon PA-C, Bk Harkins, PATRICIA, Jeff Leonard, CARMELITA, Violetta Wallace, [address] [place] [address]    Progress Note    8/10/2023    10:30 AM    Name:   Sara Argueta  MRN:     [id number]     Acct:      [de-identified]   Room:   [address] [place] [address] Day:  1  Admit Date:  8/9/2023 10:04 AM    PCP:   Peter Boyd DO  Code Status:  DNR-CCA    Subjective:     69-year-old female with a past medical history of epilepsy on antiseizure meds, hypertension, and iron deficiency anemia presents to the emergency department for bilateral lower extremity weakness and slurred speech. Patient was due to have a colonoscopy today where staff noticed her unable to stand up with slight slurred speech. In the ED CTA and head CT were completed where possible lacunar infarcts were identified. Patient has not been taking her aspirin for 5 days in preparation for her colonoscopy but has been compliant with her medications and her outpatient follow-ups. Patient follows Dr. Karlene Duong at [facility] for her epilepsy for neurology.   Patient denies nausea, vomiting, diarrhea, shortness of breath, cough, headache, received fluids in the ED. Essential hypertension-continue Coreg and clonidine. Continue lisinopril  Iron deficiency anemia-continue ferrous sulfate supplements. Monitor CBC daily. GERD-continue Pepcid.     Medical Decision Making: Mabel Victoria MD  8/10/2023  10:30 AM

## 2023-08-11 LAB
EKG ATRIAL RATE: 78 BPM
EKG P AXIS: 59 DEGREES
EKG P-R INTERVAL: 156 MS
EKG Q-T INTERVAL: 412 MS
EKG QRS DURATION: 94 MS
EKG QTC CALCULATION (BAZETT): 469 MS
EKG R AXIS: 4 DEGREES
EKG T AXIS: 41 DEGREES
EKG VENTRICULAR RATE: 78 BPM

## 2023-08-11 PROCEDURE — 97110 THERAPEUTIC EXERCISES: CPT

## 2023-08-11 PROCEDURE — 2060000000 HC ICU INTERMEDIATE R&B

## 2023-08-11 PROCEDURE — 99232 SBSQ HOSP IP/OBS MODERATE 35: CPT | Performed by: PSYCHIATRY & NEUROLOGY

## 2023-08-11 PROCEDURE — 99239 HOSP IP/OBS DSCHRG MGMT >30: CPT | Performed by: STUDENT IN AN ORGANIZED HEALTH CARE EDUCATION/TRAINING PROGRAM

## 2023-08-11 PROCEDURE — 6370000000 HC RX 637 (ALT 250 FOR IP): Performed by: STUDENT IN AN ORGANIZED HEALTH CARE EDUCATION/TRAINING PROGRAM

## 2023-08-11 PROCEDURE — 6360000002 HC RX W HCPCS: Performed by: PSYCHIATRY & NEUROLOGY

## 2023-08-11 PROCEDURE — 2580000003 HC RX 258: Performed by: STUDENT IN AN ORGANIZED HEALTH CARE EDUCATION/TRAINING PROGRAM

## 2023-08-11 PROCEDURE — 97530 THERAPEUTIC ACTIVITIES: CPT

## 2023-08-11 RX ORDER — ATORVASTATIN CALCIUM 80 MG/1
80 TABLET, FILM COATED ORAL NIGHTLY
Qty: 30 TABLET | Refills: 3 | Status: SHIPPED | OUTPATIENT
Start: 2023-08-11

## 2023-08-11 RX ORDER — CLOPIDOGREL BISULFATE 75 MG/1
75 TABLET ORAL DAILY
Qty: 30 TABLET | Refills: 3 | Status: SHIPPED | OUTPATIENT
Start: 2023-08-12

## 2023-08-11 RX ADMIN — SODIUM CHLORIDE, PRESERVATIVE FREE 10 ML: 5 INJECTION INTRAVENOUS at 08:30

## 2023-08-11 RX ADMIN — FAMOTIDINE 20 MG: 20 TABLET ORAL at 20:47

## 2023-08-11 RX ADMIN — ATORVASTATIN CALCIUM 80 MG: 40 TABLET, FILM COATED ORAL at 20:47

## 2023-08-11 RX ADMIN — CLOPIDOGREL BISULFATE 75 MG: 75 TABLET ORAL at 08:27

## 2023-08-11 RX ADMIN — CARVEDILOL 12.5 MG: 12.5 TABLET, FILM COATED ORAL at 08:27

## 2023-08-11 RX ADMIN — FERROUS SULFATE TAB EC 325 MG (65 MG FE EQUIVALENT) 325 MG: 325 (65 FE) TABLET DELAYED RESPONSE at 08:27

## 2023-08-11 RX ADMIN — ASPIRIN 81 MG: 81 TABLET, COATED ORAL at 08:26

## 2023-08-11 RX ADMIN — MONTELUKAST 10 MG: 10 TABLET, FILM COATED ORAL at 08:26

## 2023-08-11 RX ADMIN — LEVETIRACETAM 1500 MG: 500 TABLET, FILM COATED ORAL at 17:08

## 2023-08-11 RX ADMIN — PHENOBARBITAL SODIUM 16.2 MG: 65 INJECTION INTRAMUSCULAR; INTRAVENOUS at 08:28

## 2023-08-11 RX ADMIN — PHENOBARBITAL SODIUM 16.2 MG: 65 INJECTION INTRAMUSCULAR; INTRAVENOUS at 20:48

## 2023-08-11 RX ADMIN — FAMOTIDINE 20 MG: 20 TABLET ORAL at 08:26

## 2023-08-11 RX ADMIN — TAMSULOSIN HYDROCHLORIDE 0.4 MG: 0.4 CAPSULE ORAL at 08:26

## 2023-08-11 RX ADMIN — CARVEDILOL 12.5 MG: 12.5 TABLET, FILM COATED ORAL at 17:08

## 2023-08-11 RX ADMIN — PRIMIDONE 250 MG: 50 TABLET ORAL at 08:25

## 2023-08-11 RX ADMIN — LISINOPRIL 10 MG: 10 TABLET ORAL at 08:25

## 2023-08-11 RX ADMIN — SODIUM CHLORIDE, PRESERVATIVE FREE 10 ML: 5 INJECTION INTRAVENOUS at 20:54

## 2023-08-11 RX ADMIN — PRIMIDONE 250 MG: 50 TABLET ORAL at 17:07

## 2023-08-11 RX ADMIN — LEVETIRACETAM 1500 MG: 500 TABLET, FILM COATED ORAL at 08:26

## 2023-08-11 RX ADMIN — LORAZEPAM 1 MG: 1 TABLET ORAL at 21:48

## 2023-08-11 RX ADMIN — CETIRIZINE HYDROCHLORIDE 5 MG: 10 TABLET, FILM COATED ORAL at 08:27

## 2023-08-11 ASSESSMENT — PAIN SCALES - GENERAL
PAINLEVEL_OUTOF10: 0

## 2023-08-11 NOTE — CARE COORDINATION
Transitional Planning    [facility] can accept tomorrow.  Transport requested for tomorrow      Transport ETA 1200 on 8/12    [facility] @ [facility] agree[facility]      # for Report [phone]

## 2023-08-11 NOTE — PLAN OF CARE
Problem: Pain  Goal: Verbalizes/displays adequate comfort level or baseline comfort level  Outcome: Progressing   Pain scale preformed per protocol and pt treated for pain as documented. Education given. Problem: Skin/Tissue Integrity  Goal: Absence of new skin breakdown  Description: 1. Monitor for areas of redness and/or skin breakdown  2. Assess vascular access sites hourly  3. Every 4-6 hours minimum:  Change oxygen saturation probe site  4. Every 4-6 hours:  If on nasal continuous positive airway pressure, respiratory therapy assess nares and determine need for appliance change or resting period. Outcome: Progressing   Skin assessment preformed. Pt turned every 2 hours with heals elevated off bed. McAllister mattress in place. Will continue to monitor. Problem: Safety - Adult  Goal: Free from fall injury  Outcome: Progressing   Patient assessed for fall risk; fall precautions initiated. Patient and family instructed about safety devices. Environment kept free of clutter and adequate lighting provided. Bed locked and in lowest position. Call light within reach. Will continue to monitor.

## 2023-08-11 NOTE — DISCHARGE SUMMARY
[facility]  Office: 7900  1826, DO, Hina Gonzalezter, DO, Jerardo Wade, DO, Lorenzo Medicine Blood, DO, Tori Zayas MD, Vikram Farmer MD, García Padron MD, Che Thomas MD,  Ander Hicks MD, Kim Wiggins MD, Hakan García, DO, Addi Alvarado MD,  Cinthia Bryan MD, Senait Bennett MD, Tosha Aj, DO, Janusz Rizzo MD,  Jh Byrne, DO, Jessica Orona MD, Ela Boggs MD, Anthony Menendez MD, Yudi Momin MD,  Dwaine Hilliard MD, Duane Campanile, MD, Amanda Chen MD, Clive Anna, DO, No Morrison MD,  Jamila Duong MD, Batsheva Epperson, CNP,  David Park, CNP, Omer Rowley, CNP, Ruben Han, CNP,  Franklin Gusman, [facility], Tameka Leonard, CNP, Gunner Desouza, CNP, Isidra Simpson, CNP, Ailyn Murguia, CNP, Antonio Bacon, CNP, Vishnu Box PA-C, Hannah Pruitt, PATRICIA, Donovan Hill, CNP, Jamin Billings, [facility]    Discharge Summary     Patient ID: Tawana Paz  :  1945   MRN: [id number]     ACCOUNT:  [de-identified]   Patient's PCP: Mike Spencer DO  Admit Date: 2023   Discharge Date: 2023  Length of Stay: 2  Code Status:  DNR-CCA  Admitting Physician: Katheryn Real MD  Discharge Physician: Katheryn Real MD     Active Discharge Diagnoses:     Hospital Problem Lists:  Principal Problem:    CVA (cerebrovascular accident due to intracerebral hemorrhage) [facility])  Active Problems:    Lacunar infarct, acute [facility])  Resolved Problems:    * No resolved hospital problems.  *      Admission Condition:  fair     Discharged Condition: fair    Hospital Stay:     Hospital Course:  Tawana Paz is a 69 y.o. female who was admitted for the management of CVA (cerebrovascular accident due to intracerebral hemorrhage) ([address]) , presented to ER with Extremity Weakness    67-year-old female with a past medical history of epilepsy on antiseizure meds, hypertension, and iron deficiency SDI1RES, HCO3, NBEA, PBEA, BEART, BE, THGBART, THB, MMC0ULW, NPMY2PQO, W0AEDLCM, O2SAT, FIO2  No results found for: SPECIAL  Lab Results   Component Value Date/Time    CULTURE NO SIGNIFICANT GROWTH 09/19/2022 12:40 AM       Radiology:  CT HEAD WO CONTRAST    Addendum Date: 8/9/2023    ADDENDUM: Findings were communicated to Dr. Pancho Noriega by the CORE team on 8/9/2023 at 10:56 am.     Result Date: 8/9/2023  1. Patient motion partially limits evaluation. 2. No convincing acute intracranial abnormality. 3. Minimal global parenchymal volume loss with minimal chronic microvascular ischemic changes. These results were sent to the CORE Team on 8/9/2023 at 10:49 am to be communicated to the referring/covering health care provider/office. XR CHEST PORTABLE    Result Date: 8/9/2023  Left lower lung opacification reflecting atelectasis, airspace disease or pleural effusion. CTA HEAD NECK W CONTRAST    Result Date: 8/9/2023  1. Severe, near occlusive stenosis of the P2 segment of the left posterior cerebral artery. 2. Otherwise, unremarkable CT angiogram of the brain. 3. No significant arterial stenosis or dissection identified within the neck. 4. Indeterminate 1.2 cm ground-glass left upper lobe pulmonary nodule for which a follow-up chest CT is recommended in 6 months, per the recommendation. 5. Indeterminate 3.6 cm nodule within the right lobe of the thyroid gland, for which a nonemergent follow-up thyroid ultrasound is recommended. RECOMMENDATIONS: 12 mm left ground-glass pulmonary nodule within the upper lobe. Per Fleischner Society Guidelines, recommend a non-contrast Chest CT at 6 months to confirm persistence, then additional non-contrast Chest CTs every 2 years until 5 years. If nodule grows or develops solid component(s), consider resection. These guidelines do not apply to immunocompromised patients and patients with cancer. Follow up in patients with significant comorbidities as clinically warranted.  For lung

## 2023-08-11 NOTE — PROGRESS NOTES
Physical Therapy  [facility]Department[facility] SURG ICU  Physical Therapy Daily Treatment Note    Name: Maci Dejesus  : 1945  MRN: [id number]  Date of Service: 2023    Discharge Recommendations:  Patient would benefit from continued therapy after discharge   PT Equipment Recommendations  Equipment Needed: No      Patient Diagnosis(es): The encounter diagnosis was Lacunar infarct, acute ([address]). Past Medical History:  has a past medical history of Arthritis, Cancer ([address]), Hypertension, Pelvic fracture ([address]), and Seizures ([address]). Past Surgical History:  has a past surgical history that includes Cholecystectomy; Tonsillectomy; joint replacement (); Colonoscopy; and hemicolectomy (2022). Assessment   Body Structures, Functions, Activity Limitations Requiring Skilled Therapeutic Intervention: Decreased functional mobility ; Decreased endurance;Decreased strength;Decreased posture;Decreased balance  Assessment: Pt required min A for bed mobility with HOB up and min A for sit<>stand with KINGSLEY Stedy. Unable to ambulate at this time due to significant RLE weakness and decreased standing balance. Recommend for pt to have continued therapy during this hospital stay and at discharge to maximize potential for mobility, strength, balance, and endurance. Pt currently needs 24 hr physical assistance for functional mobility due to high risk of falls.   Therapy Prognosis: Fair  Decision Making: Medium Complexity  Requires PT Follow-Up: Yes  Activity Tolerance  Activity Tolerance: Patient limited by endurance  Activity Tolerance Comments: frequent rest breaks given     Plan   Physcial Therapy Plan  General Plan: Other (See Comment) (5-6x/wk)  Current Treatment Recommendations: Strengthening, ROM, Balance training, Functional mobility training, Transfer training, Endurance training, Wheelchair mobility training, Gait training, Safety education & training, Patient/Caregiver education & training,

## 2023-08-12 VITALS
OXYGEN SATURATION: 95 % | HEIGHT: 64 IN | TEMPERATURE: 98.6 F | SYSTOLIC BLOOD PRESSURE: 108 MMHG | WEIGHT: 181.88 LBS | HEART RATE: 72 BPM | BODY MASS INDEX: 31.05 KG/M2 | DIASTOLIC BLOOD PRESSURE: 54 MMHG | RESPIRATION RATE: 18 BRPM

## 2023-08-12 PROCEDURE — 6370000000 HC RX 637 (ALT 250 FOR IP): Performed by: STUDENT IN AN ORGANIZED HEALTH CARE EDUCATION/TRAINING PROGRAM

## 2023-08-12 PROCEDURE — 94761 N-INVAS EAR/PLS OXIMETRY MLT: CPT

## 2023-08-12 PROCEDURE — 6360000002 HC RX W HCPCS: Performed by: PSYCHIATRY & NEUROLOGY

## 2023-08-12 PROCEDURE — 97530 THERAPEUTIC ACTIVITIES: CPT

## 2023-08-12 PROCEDURE — 6360000002 HC RX W HCPCS: Performed by: STUDENT IN AN ORGANIZED HEALTH CARE EDUCATION/TRAINING PROGRAM

## 2023-08-12 PROCEDURE — 2580000003 HC RX 258: Performed by: STUDENT IN AN ORGANIZED HEALTH CARE EDUCATION/TRAINING PROGRAM

## 2023-08-12 PROCEDURE — 97535 SELF CARE MNGMENT TRAINING: CPT

## 2023-08-12 RX ADMIN — TAMSULOSIN HYDROCHLORIDE 0.4 MG: 0.4 CAPSULE ORAL at 08:19

## 2023-08-12 RX ADMIN — CETIRIZINE HYDROCHLORIDE 5 MG: 10 TABLET, FILM COATED ORAL at 08:20

## 2023-08-12 RX ADMIN — ENOXAPARIN SODIUM 40 MG: 100 INJECTION SUBCUTANEOUS at 08:21

## 2023-08-12 RX ADMIN — FERROUS SULFATE TAB EC 325 MG (65 MG FE EQUIVALENT) 325 MG: 325 (65 FE) TABLET DELAYED RESPONSE at 08:20

## 2023-08-12 RX ADMIN — PHENOBARBITAL SODIUM 16.2 MG: 65 INJECTION INTRAMUSCULAR; INTRAVENOUS at 08:20

## 2023-08-12 RX ADMIN — CARVEDILOL 12.5 MG: 12.5 TABLET, FILM COATED ORAL at 08:20

## 2023-08-12 RX ADMIN — FAMOTIDINE 20 MG: 20 TABLET ORAL at 08:20

## 2023-08-12 RX ADMIN — SODIUM CHLORIDE, PRESERVATIVE FREE 10 ML: 5 INJECTION INTRAVENOUS at 08:28

## 2023-08-12 RX ADMIN — ASPIRIN 81 MG: 81 TABLET, COATED ORAL at 08:21

## 2023-08-12 RX ADMIN — PRIMIDONE 250 MG: 50 TABLET ORAL at 08:20

## 2023-08-12 RX ADMIN — LEVETIRACETAM 1500 MG: 500 TABLET, FILM COATED ORAL at 08:20

## 2023-08-12 RX ADMIN — LISINOPRIL 10 MG: 10 TABLET ORAL at 08:20

## 2023-08-12 RX ADMIN — MONTELUKAST 10 MG: 10 TABLET, FILM COATED ORAL at 08:20

## 2023-08-12 RX ADMIN — CLOPIDOGREL BISULFATE 75 MG: 75 TABLET ORAL at 08:20

## 2023-08-12 ASSESSMENT — PAIN SCALES - GENERAL: PAINLEVEL_OUTOF10: 0

## 2023-08-12 NOTE — PLAN OF CARE
Problem: Discharge Planning  Goal: Discharge to home or other facility with appropriate resources  Outcome: Completed  Flowsheets (Taken 8/12/2023 0800)  Discharge to home or other facility with appropriate resources:   Identify barriers to discharge with patient and caregiver   Arrange for needed discharge resources and transportation as appropriate   Identify discharge learning needs (meds, wound care, etc)   Refer to discharge planning if patient needs post-hospital services based on physician order or complex needs related to functional status, cognitive ability or social support system   Discharge medications reviewed with the caregiver and appropriate educational materials and side effects teaching were provided.

## 2023-08-12 NOTE — PROGRESS NOTES
Occupational Therapy  Facility/Department: [facility] ICU  Occupational Therapy Daily Treatment Note    Name: Nakul Louis  : 1945  MRN: [id number]  Date of Service: 2023    Discharge Recommendations:  Patient would benefit from continued therapy after discharge     Patient Diagnosis(es): The encounter diagnosis was Lacunar infarct, acute ([address]). Past Medical History:  has a past medical history of Arthritis, Cancer ([address]), Hypertension, Pelvic fracture ([address]), and Seizures ([address]). Past Surgical History:  has a past surgical history that includes Cholecystectomy; Tonsillectomy; joint replacement (); Colonoscopy; and hemicolectomy (2022). Assessment   Performance deficits / Impairments: Decreased functional mobility ; Decreased endurance;Decreased coordination;Decreased cognition;Decreased safe awareness;Decreased strength;Decreased ADL status; Decreased balance;Decreased vision/visual deficit; Decreased fine motor control  Assessment: Pt completed OT treatment session this date, limited by above-noted deficits. Pt recommended to continue skilled OT during hospitalization and upon discharge to maximize safety and independence throughout ADLs, bed mobility, and functional transfers/mobility. Based on pt performance, pt expected to require 24/7 assist/supervision for increased safety upon discharge. REQUIRES OT FOLLOW-UP: Yes  Activity Tolerance  Activity Tolerance: Patient limited by fatigue  Activity Tolerance Comments: Mild SOB observed, pt demo'd fatigue with minimal functional tasks. Pt denies dizziness with activity. Plan   Occupational Therapy Plan  Times Per Week: 5-6x/wk  Times Per Day:  Once a day  Current Treatment Recommendations: Strengthening, ROM, Balance training, Functional mobility training, Endurance training, Neuromuscular re-education, Positioning, Modalities, Cognitive/Perceptual training, Equipment evaluation, education, & procurement, Patient/Caregiver

## 2023-08-18 ENCOUNTER — TELEPHONE (OUTPATIENT)
Dept: GASTROENTEROLOGY | Age: 78
End: 2023-08-18

## 2023-08-29 ENCOUNTER — TELEPHONE (OUTPATIENT)
Dept: SURGERY | Age: 78
End: 2023-08-29

## 2023-08-29 NOTE — TELEPHONE ENCOUNTER
Phone call to patient's phone, which was answered by her sister, Brant Terry. Writer spoke to Brant July for update on Alexandra's condition. Norma Lundy did have a stroke and is now in a rehab facility. Writer explained that we had cancelled a procedure due to symptoms that Norma Lundy was having the day of the procedure. Patient's sister, Brant July, expressed that Norma Lundy wouldn't be rescheduling her procedure at this time and \"maybe not ever\" due to her ongoing medical symptoms/conditions related to the stroke. Writer expressed to patient's sister that we wished her well and healing and asked that if they were comfortable in the future, to call to reschedule.

## 2023-10-04 ENCOUNTER — APPOINTMENT (OUTPATIENT)
Dept: GENERAL RADIOLOGY | Age: 78
End: 2023-10-04
Payer: MEDICARE

## 2023-10-04 ENCOUNTER — HOSPITAL ENCOUNTER (EMERGENCY)
Age: 78
Discharge: NURSING, REHAB OR LONG-TERM CARE | End: 2023-10-04
Attending: EMERGENCY MEDICINE
Payer: MEDICARE

## 2023-10-04 VITALS
SYSTOLIC BLOOD PRESSURE: 161 MMHG | RESPIRATION RATE: 16 BRPM | HEART RATE: 76 BPM | TEMPERATURE: 97.6 F | DIASTOLIC BLOOD PRESSURE: 86 MMHG | OXYGEN SATURATION: 96 %

## 2023-10-04 DIAGNOSIS — U07.1 COVID: ICD-10-CM

## 2023-10-04 DIAGNOSIS — N39.0 URINARY TRACT INFECTION WITHOUT HEMATURIA, SITE UNSPECIFIED: Primary | ICD-10-CM

## 2023-10-04 LAB
ALBUMIN SERPL-MCNC: 3.5 G/DL (ref 3.5–5.2)
ALBUMIN/GLOB SERPL: 1 {RATIO} (ref 1–2.5)
ALP SERPL-CCNC: 114 U/L (ref 35–104)
ALT SERPL-CCNC: 31 U/L (ref 5–33)
ANION GAP SERPL CALCULATED.3IONS-SCNC: 8 MMOL/L (ref 9–17)
AST SERPL-CCNC: 40 U/L
BACTERIA URNS QL MICRO: ABNORMAL
BASOPHILS # BLD: 0 K/UL (ref 0–0.2)
BASOPHILS NFR BLD: 1 % (ref 0–2)
BILIRUB SERPL-MCNC: 0.3 MG/DL (ref 0.3–1.2)
BILIRUB UR QL STRIP: NEGATIVE
BUN SERPL-MCNC: 23 MG/DL (ref 8–23)
CALCIUM SERPL-MCNC: 9.1 MG/DL (ref 8.6–10.4)
CHARACTER UR: ABNORMAL
CHLORIDE SERPL-SCNC: 112 MMOL/L (ref 98–107)
CLARITY UR: ABNORMAL
CO2 SERPL-SCNC: 26 MMOL/L (ref 20–31)
COLOR UR: YELLOW
CREAT SERPL-MCNC: 0.8 MG/DL (ref 0.5–0.9)
CRYSTALS URNS MICRO: ABNORMAL /HPF
EOSINOPHIL # BLD: 0.2 K/UL (ref 0–0.4)
EOSINOPHILS RELATIVE PERCENT: 3 % (ref 1–4)
EPI CELLS #/AREA URNS HPF: ABNORMAL /HPF (ref 0–5)
ERYTHROCYTE [DISTWIDTH] IN BLOOD BY AUTOMATED COUNT: 14.9 % (ref 12.5–15.4)
GFR SERPL CREATININE-BSD FRML MDRD: >60 ML/MIN/1.73M2
GLUCOSE SERPL-MCNC: 131 MG/DL (ref 70–99)
GLUCOSE UR STRIP-MCNC: NEGATIVE MG/DL
HCT VFR BLD AUTO: 28.2 % (ref 36–46)
HGB BLD-MCNC: 9.6 G/DL (ref 12–16)
HGB UR QL STRIP.AUTO: ABNORMAL
KETONES UR STRIP-MCNC: NEGATIVE MG/DL
LEUKOCYTE ESTERASE UR QL STRIP: ABNORMAL
LYMPHOCYTES NFR BLD: 0.5 K/UL (ref 1–4.8)
LYMPHOCYTES RELATIVE PERCENT: 8 % (ref 24–44)
MCH RBC QN AUTO: 32.1 PG (ref 26–34)
MCHC RBC AUTO-ENTMCNC: 33.9 G/DL (ref 31–37)
MCV RBC AUTO: 94.7 FL (ref 80–100)
MONOCYTES NFR BLD: 0.4 K/UL (ref 0.1–1.2)
MONOCYTES NFR BLD: 7 % (ref 2–11)
MUCOUS THREADS URNS QL MICRO: ABNORMAL
NEUTROPHILS NFR BLD: 81 % (ref 36–66)
NEUTS SEG NFR BLD: 5.1 K/UL (ref 1.8–7.7)
NITRITE UR QL STRIP: POSITIVE
PH UR STRIP: 8 [PH] (ref 5–8)
PLATELET # BLD AUTO: 122 K/UL (ref 140–450)
PMV BLD AUTO: 8.3 FL (ref 6–12)
POTASSIUM SERPL-SCNC: 3.9 MMOL/L (ref 3.7–5.3)
PROT SERPL-MCNC: 7 G/DL (ref 6.4–8.3)
PROT UR STRIP-MCNC: ABNORMAL MG/DL
RBC # BLD AUTO: 2.98 M/UL (ref 4–5.2)
RBC #/AREA URNS HPF: ABNORMAL /HPF (ref 0–2)
SODIUM SERPL-SCNC: 146 MMOL/L (ref 135–144)
SP GR UR STRIP: 1.02 (ref 1–1.03)
UROBILINOGEN UR STRIP-ACNC: NORMAL EU/DL (ref 0–1)
WBC #/AREA URNS HPF: ABNORMAL /HPF (ref 0–5)
WBC OTHER # BLD: 6.2 K/UL (ref 3.5–11)

## 2023-10-04 PROCEDURE — 99284 EMERGENCY DEPT VISIT MOD MDM: CPT

## 2023-10-04 PROCEDURE — 2500000003 HC RX 250 WO HCPCS

## 2023-10-04 PROCEDURE — 36415 COLL VENOUS BLD VENIPUNCTURE: CPT

## 2023-10-04 PROCEDURE — 96372 THER/PROPH/DIAG INJ SC/IM: CPT

## 2023-10-04 PROCEDURE — 6370000000 HC RX 637 (ALT 250 FOR IP)

## 2023-10-04 PROCEDURE — 71045 X-RAY EXAM CHEST 1 VIEW: CPT

## 2023-10-04 PROCEDURE — 6360000002 HC RX W HCPCS

## 2023-10-04 PROCEDURE — 87086 URINE CULTURE/COLONY COUNT: CPT

## 2023-10-04 PROCEDURE — 87186 SC STD MICRODIL/AGAR DIL: CPT

## 2023-10-04 PROCEDURE — 85025 COMPLETE CBC W/AUTO DIFF WBC: CPT

## 2023-10-04 PROCEDURE — 80053 COMPREHEN METABOLIC PANEL: CPT

## 2023-10-04 PROCEDURE — 81001 URINALYSIS AUTO W/SCOPE: CPT

## 2023-10-04 PROCEDURE — 87088 URINE BACTERIA CULTURE: CPT

## 2023-10-04 RX ORDER — NITROFURANTOIN 25; 75 MG/1; MG/1
100 CAPSULE ORAL ONCE
Status: COMPLETED | OUTPATIENT
Start: 2023-10-04 | End: 2023-10-04

## 2023-10-04 RX ORDER — NITROFURANTOIN 25; 75 MG/1; MG/1
100 CAPSULE ORAL 2 TIMES DAILY
Qty: 14 CAPSULE | Refills: 0 | Status: SHIPPED | OUTPATIENT
Start: 2023-10-04 | End: 2023-10-11

## 2023-10-04 RX ORDER — CEFTRIAXONE 1 G/1
1000 INJECTION, POWDER, FOR SOLUTION INTRAMUSCULAR; INTRAVENOUS ONCE
Status: DISCONTINUED | OUTPATIENT
Start: 2023-10-04 | End: 2023-10-04

## 2023-10-04 RX ORDER — LIDOCAINE HYDROCHLORIDE 10 MG/ML
INJECTION, SOLUTION EPIDURAL; INFILTRATION; INTRACAUDAL; PERINEURAL
Status: COMPLETED
Start: 2023-10-04 | End: 2023-10-04

## 2023-10-04 RX ORDER — CEFTRIAXONE 1 G/1
1000 INJECTION, POWDER, FOR SOLUTION INTRAMUSCULAR; INTRAVENOUS ONCE
Status: COMPLETED | OUTPATIENT
Start: 2023-10-04 | End: 2023-10-04

## 2023-10-04 RX ADMIN — LIDOCAINE HYDROCHLORIDE 2.1 ML: 10 SOLUTION INTRAVENOUS at 19:20

## 2023-10-04 RX ADMIN — NITROFURANTOIN MONOHYDRATE/MACROCRYSTALS 100 MG: 75; 25 CAPSULE ORAL at 19:13

## 2023-10-04 RX ADMIN — CEFTRIAXONE SODIUM 1000 MG: 1 INJECTION, POWDER, FOR SOLUTION INTRAMUSCULAR; INTRAVENOUS at 19:14

## 2023-10-04 ASSESSMENT — PAIN - FUNCTIONAL ASSESSMENT: PAIN_FUNCTIONAL_ASSESSMENT: NONE - DENIES PAIN

## 2023-10-04 ASSESSMENT — ENCOUNTER SYMPTOMS
SHORTNESS OF BREATH: 1
NAUSEA: 0
VOMITING: 0
COUGH: 1
DOUBLE VISION: 0
BLURRED VISION: 0

## 2023-10-04 ASSESSMENT — PAIN SCALES - GENERAL: PAINLEVEL_OUTOF10: 0

## 2023-10-04 NOTE — ED PROVIDER NOTES
[facility] Emergency Department  [address] [address] [address]  Phone: [phone]  Fax: [phone]      Attending Physician [facility]       Chief Complaint   Patient presents with    Positive For Covid-19     Brought by ems from SNF with c/o increased weakness and falls. Reports recently tested positive for COVID. Pt denies any pain or injuries. Pt states \"I have a mucous problem. \" Respirations are even and non-labored; SPO2 96% on RA. DIAGNOSTIC RESULTS     LABS:  Labs Reviewed   CBC WITH AUTO DIFFERENTIAL   COMPREHENSIVE METABOLIC PANEL W/ REFLEX TO MG FOR LOW K   URINALYSIS WITH REFLEX TO CULTURE       All other labs were within normal range or not returned as of this dictation. RADIOLOGY:  XR CHEST PORTABLE    (Results Pending)         EMERGENCY DEPARTMENT COURSE:   Vitals:    Vitals:    10/04/23 1557   BP: 113/61   Pulse: 76   Resp: 16   SpO2: 96%     -------------------------  BP: 113/61,  , Pulse: 76, Respirations: 16      ED Course as of 10/04/23 1849   Wed Oct 04, 2023   1813 Urinalysis with Reflex to Culture [GS]   1829 Urinalysis with Reflex to Culture [GS]      ED Course User Index  [GS] Thong Thompson, DO         PERTINENT ATTENDING PHYSICIAN COMMENTS:    I performed a history and physical examination of the patient and discussed management with the mid level provider. I reviewed the mid level provider's note and agree with the documented findings and plan of care. Any areas of disagreement are noted on the chart. I was personally present for the key portions of any procedures. I have documented in the chart those procedures where I was not present during the key portions. I have reviewed the emergency nurses triage note. I agree with the chief complaint, past medical history, past surgical history, allergies, medications, social and family history as documented unless otherwise noted below.  Documentation of the HPI,

## 2023-10-04 NOTE — ED NOTES
[facility] [place] Emergency Department  [address] [address] [facility] [address]  Phone: [phone]  Fax: [address]          Pt Name: Messi eDl Angel  MRN: [id number]  [address] [facility] [address] 1945  Date of evaluation: 10/4/2023      CHIEF COMPLAINT       Chief Complaint   Patient presents with    Positive For Covid-19     Brought by ems from SNF with c/o increased weakness and falls. Reports recently tested positive for COVID. Pt denies any pain or injuries. Pt states \"I have a mucous problem. \" Respirations are even and non-labored; SPO2 96% on RA. HISTORY OF PRESENT ILLNESS       Messi Del Angel is a 69 y.o. female with a history of lacunar infarct, arthritis, hypertension, iron deficiency anemia, seizures, pelvic fracture, recent diagnosis of COVID, who presents to the ED with shortness of breath and weakness after falling while trying to get up from her wheel chair. She denied chest pain, nausea, vomiting, numbness, tingling. REVIEW OF SYSTEMS       Review of Systems   Constitutional:  Negative for chills and fever. Eyes:  Negative for blurred vision and double vision. Respiratory:  Positive for cough and shortness of breath. Gastrointestinal:  Negative for nausea and vomiting. Musculoskeletal:  Positive for falls. Neurological:  Positive for dizziness. All other systems reviewed and are negative. PAST MEDICAL HISTORY    has a past medical history of Arthritis, Cancer ([address]), Hypertension, Pelvic fracture ([address]), and Seizures ([address]). SURGICAL HISTORY      has a past surgical history that includes Cholecystectomy; Tonsillectomy; joint replacement (2014); Colonoscopy; and hemicolectomy (11/2022).     CURRENT MEDICATIONS       Previous Medications    ACETAMINOPHEN (TYLENOL) 325 MG TABLET    Take 2 tablets by mouth every 6 hours as needed for Pain    ASPIRIN 81 MG EC TABLET    Take 1 tablet by mouth in the morning and at bedtime DO  Resident  10/04/23 Luz Maria Roldan, DO  Resident  10/04/23 [facility]

## 2023-10-06 LAB
MICROORGANISM SPEC CULT: ABNORMAL
SPECIMEN DESCRIPTION: ABNORMAL

## 2023-10-10 ENCOUNTER — HOSPITAL ENCOUNTER (EMERGENCY)
Age: 78
Discharge: HOME | End: 2023-10-10
Attending: STUDENT IN AN ORGANIZED HEALTH CARE EDUCATION/TRAINING PROGRAM
Payer: MEDICARE

## 2023-10-10 ENCOUNTER — APPOINTMENT (OUTPATIENT)
Dept: CT IMAGING | Age: 78
End: 2023-10-10
Payer: MEDICARE

## 2023-10-10 VITALS
BODY MASS INDEX: 30.05 KG/M2 | DIASTOLIC BLOOD PRESSURE: 76 MMHG | OXYGEN SATURATION: 93 % | RESPIRATION RATE: 20 BRPM | HEART RATE: 73 BPM | TEMPERATURE: 98.2 F | WEIGHT: 176 LBS | SYSTOLIC BLOOD PRESSURE: 127 MMHG | HEIGHT: 64 IN

## 2023-10-10 DIAGNOSIS — R74.8 ELEVATED LIVER ENZYMES: ICD-10-CM

## 2023-10-10 DIAGNOSIS — J12.82 PNEUMONIA DUE TO COVID-19 VIRUS: ICD-10-CM

## 2023-10-10 DIAGNOSIS — U07.1 PNEUMONIA DUE TO COVID-19 VIRUS: ICD-10-CM

## 2023-10-10 DIAGNOSIS — N39.0 ACUTE UTI (URINARY TRACT INFECTION): Primary | ICD-10-CM

## 2023-10-10 DIAGNOSIS — S22.42XA CLOSED FRACTURE OF MULTIPLE RIBS OF LEFT SIDE, INITIAL ENCOUNTER: ICD-10-CM

## 2023-10-10 LAB
ALBUMIN SERPL-MCNC: 3.1 G/DL (ref 3.5–5.2)
ALBUMIN/GLOB SERPL: 0.8 {RATIO} (ref 1–2.5)
ALP SERPL-CCNC: 143 U/L (ref 35–104)
ALT SERPL-CCNC: 63 U/L (ref 5–33)
ANION GAP SERPL CALCULATED.3IONS-SCNC: 7 MMOL/L (ref 9–17)
AST SERPL-CCNC: 82 U/L
BACTERIA URNS QL MICRO: ABNORMAL
BASOPHILS # BLD: 0 K/UL (ref 0–0.2)
BASOPHILS NFR BLD: 1 % (ref 0–2)
BILIRUB SERPL-MCNC: 0.1 MG/DL (ref 0.3–1.2)
BILIRUB UR QL STRIP: NEGATIVE
BNP SERPL-MCNC: 234 PG/ML
BUN SERPL-MCNC: 32 MG/DL (ref 8–23)
CALCIUM SERPL-MCNC: 9.2 MG/DL (ref 8.6–10.4)
CASTS #/AREA URNS LPF: ABNORMAL /LPF
CASTS #/AREA URNS LPF: ABNORMAL /LPF
CHARACTER UR: ABNORMAL
CHLORIDE SERPL-SCNC: 113 MMOL/L (ref 98–107)
CLARITY UR: ABNORMAL
CO2 SERPL-SCNC: 25 MMOL/L (ref 20–31)
COLOR UR: YELLOW
CREAT SERPL-MCNC: 1 MG/DL (ref 0.5–0.9)
EOSINOPHIL # BLD: 0.2 K/UL (ref 0–0.4)
EOSINOPHILS RELATIVE PERCENT: 4 % (ref 1–4)
EPI CELLS #/AREA URNS HPF: ABNORMAL /HPF (ref 0–5)
ERYTHROCYTE [DISTWIDTH] IN BLOOD BY AUTOMATED COUNT: 14.3 % (ref 12.5–15.4)
GFR SERPL CREATININE-BSD FRML MDRD: 58 ML/MIN/1.73M2
GLUCOSE SERPL-MCNC: 123 MG/DL (ref 70–99)
GLUCOSE UR STRIP-MCNC: NEGATIVE MG/DL
HCT VFR BLD AUTO: 26.4 % (ref 36–46)
HGB BLD-MCNC: 8.9 G/DL (ref 12–16)
HGB UR QL STRIP.AUTO: ABNORMAL
INR PPP: 1.1
KETONES UR STRIP-MCNC: NEGATIVE MG/DL
LACTATE BLDV-SCNC: 1.1 MMOL/L (ref 0.5–2.2)
LEUKOCYTE ESTERASE UR QL STRIP: ABNORMAL
LYMPHOCYTES NFR BLD: 0.6 K/UL (ref 1–4.8)
LYMPHOCYTES RELATIVE PERCENT: 10 % (ref 24–44)
MCH RBC QN AUTO: 32.2 PG (ref 26–34)
MCHC RBC AUTO-ENTMCNC: 33.8 G/DL (ref 31–37)
MCV RBC AUTO: 95.4 FL (ref 80–100)
MONOCYTES NFR BLD: 0.4 K/UL (ref 0.1–1.2)
MONOCYTES NFR BLD: 6 % (ref 2–11)
MUCOUS THREADS URNS QL MICRO: ABNORMAL
NEUTROPHILS NFR BLD: 79 % (ref 36–66)
NEUTS SEG NFR BLD: 4.4 K/UL (ref 1.8–7.7)
NITRITE UR QL STRIP: NEGATIVE
PH UR STRIP: 5.5 [PH] (ref 5–8)
PLATELET # BLD AUTO: 175 K/UL (ref 140–450)
PMV BLD AUTO: 8.5 FL (ref 6–12)
POTASSIUM SERPL-SCNC: 4.3 MMOL/L (ref 3.7–5.3)
PROT SERPL-MCNC: 6.8 G/DL (ref 6.4–8.3)
PROT UR STRIP-MCNC: ABNORMAL MG/DL
PROTHROMBIN TIME: 11.5 SEC (ref 9.4–12.6)
RBC # BLD AUTO: 2.76 M/UL (ref 4–5.2)
RBC #/AREA URNS HPF: ABNORMAL /HPF (ref 0–2)
SODIUM SERPL-SCNC: 145 MMOL/L (ref 135–144)
SP GR UR STRIP: 1.03 (ref 1–1.03)
TROPONIN I SERPL HS-MCNC: 13 NG/L (ref 0–14)
UROBILINOGEN UR STRIP-ACNC: NORMAL EU/DL (ref 0–1)
WBC #/AREA URNS HPF: ABNORMAL /HPF (ref 0–5)
WBC OTHER # BLD: 5.6 K/UL (ref 3.5–11)

## 2023-10-10 PROCEDURE — 81001 URINALYSIS AUTO W/SCOPE: CPT

## 2023-10-10 PROCEDURE — 6360000002 HC RX W HCPCS: Performed by: NURSE PRACTITIONER

## 2023-10-10 PROCEDURE — 93005 ELECTROCARDIOGRAM TRACING: CPT | Performed by: NURSE PRACTITIONER

## 2023-10-10 PROCEDURE — 87040 BLOOD CULTURE FOR BACTERIA: CPT

## 2023-10-10 PROCEDURE — 83605 ASSAY OF LACTIC ACID: CPT

## 2023-10-10 PROCEDURE — 96365 THER/PROPH/DIAG IV INF INIT: CPT | Performed by: STUDENT IN AN ORGANIZED HEALTH CARE EDUCATION/TRAINING PROGRAM

## 2023-10-10 PROCEDURE — 36415 COLL VENOUS BLD VENIPUNCTURE: CPT

## 2023-10-10 PROCEDURE — 84484 ASSAY OF TROPONIN QUANT: CPT

## 2023-10-10 PROCEDURE — 6360000004 HC RX CONTRAST MEDICATION: Performed by: EMERGENCY MEDICINE

## 2023-10-10 PROCEDURE — 2580000003 HC RX 258: Performed by: EMERGENCY MEDICINE

## 2023-10-10 PROCEDURE — 83880 ASSAY OF NATRIURETIC PEPTIDE: CPT

## 2023-10-10 PROCEDURE — 80053 COMPREHEN METABOLIC PANEL: CPT

## 2023-10-10 PROCEDURE — 2580000003 HC RX 258: Performed by: NURSE PRACTITIONER

## 2023-10-10 PROCEDURE — 99285 EMERGENCY DEPT VISIT HI MDM: CPT | Performed by: STUDENT IN AN ORGANIZED HEALTH CARE EDUCATION/TRAINING PROGRAM

## 2023-10-10 PROCEDURE — 71260 CT THORAX DX C+: CPT

## 2023-10-10 PROCEDURE — 85025 COMPLETE CBC W/AUTO DIFF WBC: CPT

## 2023-10-10 PROCEDURE — 85610 PROTHROMBIN TIME: CPT

## 2023-10-10 RX ORDER — SODIUM CHLORIDE 0.9 % (FLUSH) 0.9 %
10 SYRINGE (ML) INJECTION PRN
Status: DISCONTINUED | OUTPATIENT
Start: 2023-10-10 | End: 2023-10-10 | Stop reason: HOSPADM

## 2023-10-10 RX ORDER — 0.9 % SODIUM CHLORIDE 0.9 %
1000 INTRAVENOUS SOLUTION INTRAVENOUS ONCE
Status: COMPLETED | OUTPATIENT
Start: 2023-10-10 | End: 2023-10-10

## 2023-10-10 RX ORDER — HYDROCODONE BITARTRATE AND ACETAMINOPHEN 5; 325 MG/1; MG/1
1 TABLET ORAL EVERY 6 HOURS PRN
Qty: 12 TABLET | Refills: 0 | Status: SHIPPED | OUTPATIENT
Start: 2023-10-10 | End: 2023-10-13

## 2023-10-10 RX ORDER — 0.9 % SODIUM CHLORIDE 0.9 %
80 INTRAVENOUS SOLUTION INTRAVENOUS ONCE
Status: DISCONTINUED | OUTPATIENT
Start: 2023-10-10 | End: 2023-10-10 | Stop reason: HOSPADM

## 2023-10-10 RX ORDER — FENTANYL CITRATE 50 UG/ML
25 INJECTION, SOLUTION INTRAMUSCULAR; INTRAVENOUS ONCE
Status: DISCONTINUED | OUTPATIENT
Start: 2023-10-10 | End: 2023-10-10 | Stop reason: HOSPADM

## 2023-10-10 RX ORDER — CEPHALEXIN 500 MG/1
500 CAPSULE ORAL 2 TIMES DAILY
Qty: 14 CAPSULE | Refills: 0 | Status: SHIPPED | OUTPATIENT
Start: 2023-10-10

## 2023-10-10 RX ADMIN — IOPAMIDOL 75 ML: 755 INJECTION, SOLUTION INTRAVENOUS at 16:07

## 2023-10-10 RX ADMIN — CEFTRIAXONE SODIUM 1000 MG: 1 INJECTION, POWDER, FOR SOLUTION INTRAMUSCULAR; INTRAVENOUS at 17:51

## 2023-10-10 RX ADMIN — Medication 80 ML: at 16:08

## 2023-10-10 RX ADMIN — SODIUM CHLORIDE, PRESERVATIVE FREE 10 ML: 5 INJECTION INTRAVENOUS at 16:07

## 2023-10-10 RX ADMIN — SODIUM CHLORIDE 1000 ML: 9 INJECTION, SOLUTION INTRAVENOUS at 15:26

## 2023-10-10 ASSESSMENT — PAIN - FUNCTIONAL ASSESSMENT: PAIN_FUNCTIONAL_ASSESSMENT: NONE - DENIES PAIN

## 2023-10-10 NOTE — DISCHARGE INSTRUCTIONS
Patient has a continued urinary tract infection but it does show some improvement from last ER visit. She does need to take Keflex twice a day for 1 week. I have provided a scription for Tracy Hugo as she does have multiple rib fractures that were found on CAT scan. She can have oxygen as needed to keep her pulse ox above 90% at nursing home. She should follow closely with the provider that covers nursing home. If patient does not seem to be recovering and continues to decline I would recommend a hospice consult.

## 2023-10-10 NOTE — ED PROVIDER NOTES
[address] [facility]  Emergency Department  Emergency Medicine Attending Physician  [facility] Emergency Services     Patient Name: Janki Frye  MRN: [id number]  [address] [facility] 1945  Date of evaluation: 10/10/23           I was personally available for consultation in the Emergency Department. Have reviewed everything on the chart that is available and agree with the documentation provided by the advanced practice provider, including discussion about the assessment, treatment plan and disposition. Janki Frye is a 69 y.o. female who presents with Fatigue (Pt  from [place], day 9 of COVID was here a few days ago d/c with uti, ems 20g pt dnrcc)      HPI Noted on my Interview and Exam: 64-year-old female who lives at [facility] for duration and worsening of condition. COVID diagnosed 9 days ago. Was recently evaluated in our ER diagnosed with urinary tract infection. Patient was provided Macrobid at that time. Medical significant for stroke, abdomen, reports he was ordered. PAST MEDICAL / SURGICAL / SOCIAL / FAMILY HISTORY      has a past medical history of Arthritis, Cancer ([address]), Hypertension, Pelvic fracture ([address]), and Seizures ([address]). has a past surgical history that includes Cholecystectomy; Tonsillectomy; joint replacement (2014); Colonoscopy; and hemicolectomy (11/2022).       Social History     Socioeconomic History    Marital status: Single     Spouse name: Not on file    Number of children: Not on file    Years of education: Not on file    Highest education level: Not on file   Occupational History    Not on file   Tobacco Use    Smoking status: Never    Smokeless tobacco: Never   Substance and Sexual Activity    Alcohol use: No    Drug use: No    Sexual activity: Never   Other Topics Concern    Not on file   Social History Narrative    Not on file     Social Determinants of Health     Financial Resource Strain: Not on file   Food Insecurity: Not on file
pH, UA 5.5 5.0 - 8.0    Protein, UA TRACE (A) NEGATIVE mg/dL    Urobilinogen, Urine Normal 0.0 - 1.0 EU/dL    Nitrite, Urine NEGATIVE NEGATIVE    Leukocyte Esterase, Urine SMALL (A) NEGATIVE   Microscopic Urinalysis   Result Value Ref Range    WBC, UA 5 TO 10 0 - 5 /HPF    RBC, UA 0 TO 2 0 - 2 /HPF    Casts UA 0 TO 2 /LPF    Casts UA HYALINE /LPF    Epithelial Cells UA 5 TO 10 0 - 5 /HPF    Bacteria, UA MANY (A) None    Mucus, UA 1+ (A) None    Other Observations UA (A) NOT REQ. Utilizing a urinalysis as the only screening method to exclude a potential uropathogen can be unreliable in many patient populations. Rapid screening tests are less sensitive than culture and if UTI is a clinical possibility, culture should be considered despite a negative urinalysis. EKG 12 Lead   Result Value Ref Range    Ventricular Rate 72 BPM    Atrial Rate 72 BPM    P-R Interval 166 ms    QRS Duration 84 ms    Q-T Interval 414 ms    QTc Calculation (Bazett) 453 ms    P Axis 32 degrees    R Axis -17 degrees    T Axis 32 degrees       EMERGENCY DEPARTMENT COURSE:  I did review previous ER note. They did provide her with Macrobid however when I reviewed the culture it was actually the only thing this particular bacteria resistant to. This is possible early urosepsis. Will order septic work-up for this reason. Could also be a PE as she is on day 9 of COVID. She does appear mildly short of breath. She appears very weak and tired and acutely ill. Patient without elevated white blood cell count. Normal lactic. She has had a slow downtrend in her hemoglobin but this has been occurring over years when I look at the trends in the chart. This could be followed closely by the nursing home physician. She has a mild elevation in liver enzyme which can be followed as an outpatient. I have reviewed her electrolytes. No pulmonary embolism. Changes consistent with COVID.   Also noted to have acute/subacute rib fractures of 8

## 2023-10-11 LAB
EKG ATRIAL RATE: 72 BPM
EKG P AXIS: 32 DEGREES
EKG P-R INTERVAL: 166 MS
EKG Q-T INTERVAL: 414 MS
EKG QRS DURATION: 84 MS
EKG QTC CALCULATION (BAZETT): 453 MS
EKG R AXIS: -17 DEGREES
EKG T AXIS: 32 DEGREES
EKG VENTRICULAR RATE: 72 BPM

## 2023-10-11 ASSESSMENT — ENCOUNTER SYMPTOMS
ABDOMINAL PAIN: 0
SHORTNESS OF BREATH: 1

## 2023-10-15 LAB
MICROORGANISM SPEC CULT: NORMAL
MICROORGANISM SPEC CULT: NORMAL
SERVICE CMNT-IMP: NORMAL
SERVICE CMNT-IMP: NORMAL
SPECIMEN DESCRIPTION: NORMAL
SPECIMEN DESCRIPTION: NORMAL

## 2024-02-23 ENCOUNTER — APPOINTMENT (OUTPATIENT)
Dept: CT IMAGING | Age: 79
End: 2024-02-23
Payer: MEDICARE

## 2024-02-23 ENCOUNTER — HOSPITAL ENCOUNTER (EMERGENCY)
Age: 79
Discharge: NURSING, REHAB OR LONG-TERM CARE | End: 2024-02-23
Attending: EMERGENCY MEDICINE
Payer: MEDICARE

## 2024-02-23 ENCOUNTER — HOSPITAL ENCOUNTER (EMERGENCY)
Age: 79
Discharge: ANOTHER HOSPITAL | End: 2024-02-23
Attending: EMERGENCY MEDICINE
Payer: MEDICARE

## 2024-02-23 VITALS
WEIGHT: 164 LBS | RESPIRATION RATE: 20 BRPM | HEART RATE: 68 BPM | BODY MASS INDEX: 28 KG/M2 | HEIGHT: 64 IN | DIASTOLIC BLOOD PRESSURE: 82 MMHG | OXYGEN SATURATION: 94 % | TEMPERATURE: 98.5 F | SYSTOLIC BLOOD PRESSURE: 160 MMHG

## 2024-02-23 VITALS
OXYGEN SATURATION: 96 % | RESPIRATION RATE: 14 BRPM | TEMPERATURE: 97.9 F | SYSTOLIC BLOOD PRESSURE: 149 MMHG | DIASTOLIC BLOOD PRESSURE: 88 MMHG | HEART RATE: 70 BPM

## 2024-02-23 DIAGNOSIS — H57.12 LEFT EYE PAIN: Primary | ICD-10-CM

## 2024-02-23 DIAGNOSIS — S00.81XA ABRASION OF FACE, INITIAL ENCOUNTER: ICD-10-CM

## 2024-02-23 DIAGNOSIS — S09.90XA INJURY OF HEAD, INITIAL ENCOUNTER: Primary | ICD-10-CM

## 2024-02-23 DIAGNOSIS — S00.83XA CONTUSION OF FACE, INITIAL ENCOUNTER: ICD-10-CM

## 2024-02-23 PROCEDURE — 6370000000 HC RX 637 (ALT 250 FOR IP): Performed by: EMERGENCY MEDICINE

## 2024-02-23 PROCEDURE — 70450 CT HEAD/BRAIN W/O DYE: CPT

## 2024-02-23 PROCEDURE — 70486 CT MAXILLOFACIAL W/O DYE: CPT

## 2024-02-23 PROCEDURE — 99285 EMERGENCY DEPT VISIT HI MDM: CPT

## 2024-02-23 PROCEDURE — 99283 EMERGENCY DEPT VISIT LOW MDM: CPT

## 2024-02-23 PROCEDURE — 72125 CT NECK SPINE W/O DYE: CPT

## 2024-02-23 RX ORDER — LISINOPRIL 10 MG/1
10 TABLET ORAL
Status: CANCELLED | OUTPATIENT
Start: 2024-02-23

## 2024-02-23 RX ORDER — TOBRAMYCIN 3 MG/ML
1 SOLUTION/ DROPS OPHTHALMIC EVERY 6 HOURS
Qty: 1 EACH | Refills: 1 | Status: SHIPPED | OUTPATIENT
Start: 2024-02-23 | End: 2024-03-08

## 2024-02-23 RX ORDER — TOBRAMYCIN 3 MG/ML
2 SOLUTION/ DROPS OPHTHALMIC
Status: DISCONTINUED | OUTPATIENT
Start: 2024-02-23 | End: 2024-02-23 | Stop reason: HOSPADM

## 2024-02-23 RX ORDER — CARVEDILOL 12.5 MG/1
12.5 TABLET ORAL 2 TIMES DAILY WITH MEALS
Status: CANCELLED | OUTPATIENT
Start: 2024-02-23

## 2024-02-23 RX ORDER — LEVETIRACETAM 500 MG/1
1000 TABLET ORAL 2 TIMES DAILY
Status: CANCELLED | OUTPATIENT
Start: 2024-02-23

## 2024-02-23 RX ORDER — TOBRAMYCIN 3 MG/ML
2 SOLUTION/ DROPS OPHTHALMIC ONCE
Status: COMPLETED | OUTPATIENT
Start: 2024-02-23 | End: 2024-02-23

## 2024-02-23 RX ORDER — CLONIDINE HYDROCHLORIDE 0.1 MG/1
0.1 TABLET ORAL PRN
Status: CANCELLED | OUTPATIENT
Start: 2024-02-23

## 2024-02-23 RX ORDER — ATORVASTATIN CALCIUM 80 MG/1
80 TABLET, FILM COATED ORAL NIGHTLY
Status: CANCELLED | OUTPATIENT
Start: 2024-02-23

## 2024-02-23 RX ORDER — LORAZEPAM 0.5 MG/1
1 TABLET ORAL NIGHTLY PRN
Status: CANCELLED | OUTPATIENT
Start: 2024-02-23

## 2024-02-23 RX ORDER — LEVETIRACETAM 500 MG/1
500 TABLET ORAL 2 TIMES DAILY
Status: CANCELLED | OUTPATIENT
Start: 2024-02-23

## 2024-02-23 RX ADMIN — FLUORESCEIN SODIUM 1 MG: 1 STRIP OPHTHALMIC at 08:29

## 2024-02-23 RX ADMIN — TOBRAMYCIN 2 DROP: 3 SOLUTION OPHTHALMIC at 10:20

## 2024-02-23 ASSESSMENT — PAIN - FUNCTIONAL ASSESSMENT
PAIN_FUNCTIONAL_ASSESSMENT: NONE - DENIES PAIN
PAIN_FUNCTIONAL_ASSESSMENT: NONE - DENIES PAIN

## 2024-02-23 ASSESSMENT — ENCOUNTER SYMPTOMS
ABDOMINAL PAIN: 0
SHORTNESS OF BREATH: 0
NAUSEA: 0
SORE THROAT: 0
VOMITING: 0
SHORTNESS OF BREATH: 0
EYE PAIN: 0
COUGH: 0
EYE PAIN: 1
NAUSEA: 0
FACIAL SWELLING: 1
BACK PAIN: 0
DIARRHEA: 0
RHINORRHEA: 0
VOMITING: 0

## 2024-02-23 NOTE — ED NOTES
Patient is resting on cot, no acute distress noted. Patient is AOx4. Bed is in lowest position with call light with in reach. Patient denies any further needs at this time and will continue with plan of care.

## 2024-02-23 NOTE — ED PROVIDER NOTES
[facility] ED     Emergency Department     Faculty Attestation    I performed a history and physical examination of the patient and discussed management with the resident. I reviewed the resident’s note and agree with the documented findings and plan of care. Any areas of disagreement are noted on the chart. I was personally present for the key portions of any procedures. I have documented in the chart those procedures where I was not present during the key portions. I have reviewed the emergency nurses triage note. I agree with the chief complaint, past medical history, past surgical history, allergies, medications, social and family history as documented unless otherwise noted below. For Physician Assistant/ Nurse Practitioner cases/documentation I have personally evaluated this patient and have completed at least one if not all key elements of the E/M (history, physical exam, and MDM). Additional findings are as noted.    1:02 PM EST    Patient transferred from [place] with concern for left globe rupture.  Patient had a trip and fall this morning and hit her face on a garbage can.  CT scan of the head, facial bones, and cervical spine were unremarkable.  However, patient had a positive Gladys sign on fluorescein exam of the eye.  Patient was sent here for evaluation by ophthalmology.  On my exam, patient has no complaints at this time.  She denies any pain.  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  Will consult ophthalmology.      Joyce Barroso MD  Attending Emergency  Physician

## 2024-02-23 NOTE — ED PROVIDER NOTES
[facility] Emergency Department  [address]  [facility] [address]  Phone: [phone]  Fax: [phone]    EMERGENCY DEPARTMENT ENCOUNTER          Pt Name: Alexandra Chen  MRN: [id number]  Birthdate 1945  Date of evaluation: 2/23/2024      CHIEF COMPLAINT       Chief Complaint   Patient presents with    Fall     Slid out of bed denies LOC     Laceration     On nose        HISTORY OF PRESENT ILLNESS       Alexandra Chen is a 78 y.o. female who presents with a head injury to her left face and periorbital region and also her nose.  She slipped getting out of bed and fifth slipped on the bed and struck her face on her garbage can.  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  That is her baseline for both issues.  She is conversive and appropriate in the room.  She arrived via EMS.  Reports some mild discomfort to her face but otherwise no symptoms.  Denies neck or back pain.  No chest or abdominal pain.  Denies any hip/pelvis or lower extremity pain.  Denies any other symptoms, injuries or concerns.  She is on Plavix.    REVIEW OF SYSTEMS       Review of Systems   Constitutional:  Negative for chills, fatigue and fever.   HENT:  Negative for rhinorrhea and sore throat.    Eyes:  Negative for pain.   Respiratory:  Negative for cough and shortness of breath.    Cardiovascular:  Negative for chest pain.   Gastrointestinal:  Negative for abdominal pain, diarrhea, nausea and vomiting.   Genitourinary:  Negative for difficulty urinating.   Musculoskeletal:  Negative for back pain and neck pain.   Skin:  Positive for wound. Negative for rash.   Neurological:  Negative for weakness and headaches.        PAST MEDICAL HISTORY    has a past medical history of Arthritis, Cancer (HCC), Hypertension, Pelvic fracture (HCC), and Seizures (HCC).    SURGICAL HISTORY      has a past surgical history that includes  Cholecystectomy; Tonsillectomy; joint replacement (2014); Colonoscopy; and hemicolectomy (11/2022).    CURRENT MEDICATIONS       Current Discharge Medication List        CONTINUE these medications which have NOT CHANGED    Details   cephALEXin (KEFLEX) 500 MG capsule Take 1 capsule by mouth 2 times daily  Qty: 14 capsule, Refills: 0      atorvastatin (LIPITOR) 80 MG tablet Take 1 tablet by mouth nightly  Qty: 30 tablet, Refills: 3      clopidogrel (PLAVIX) 75 MG tablet Take 1 tablet by mouth daily  Qty: 30 tablet, Refills: 3      !! levETIRAcetam (KEPPRA) 500 MG tablet Take 2 tablets by mouth 2 times daily      Sodium Sulfate-Mag Sulfate-KCl (SUTAB) 1284-701-772 MG TABS Follow bowel prep instructions  Qty: 12 tablet, Refills: 0      acetaminophen (TYLENOL) 325 MG tablet Take 2 tablets by mouth every 6 hours as needed for Pain      aspirin 81 MG EC tablet Take 1 tablet by mouth in the morning and at bedtime      carvedilol (COREG) 12.5 MG tablet Take 1 tablet by mouth 2 times daily (with meals)      cloNIDine (CATAPRES) 0.1 MG tablet Take 1 tablet by mouth as needed Q12 PRN      famotidine (PEPCID) 20 MG tablet Take 1 tablet by mouth 2 times daily      ferrous sulfate (IRON 325) 325 (65 Fe) MG tablet Take 1 tablet by mouth daily (with breakfast)      tamsulosin (FLOMAX) 0.4 MG capsule Take 1 capsule by mouth daily      ketoconazole (NIZORAL) 2 % cream Apply topically daily Apply topically daily.      lisinopril (PRINIVIL;ZESTRIL) 10 MG tablet Take 1 tablet by mouth Daily with supper      loperamide (IMODIUM) 2 MG capsule Take 1 capsule by mouth as needed for Diarrhea      loratadine (CLARITIN) 10 MG capsule Take 1 capsule by mouth daily      potassium chloride (KLOR-CON) 20 MEQ packet Take 20 mEq by mouth daily      triamcinolone (KENALOG) 0.1 % cream Apply topically daily as needed Apply topically 2 times daily.      Cholecalciferol (VITAMIN D3) 50 MCG (2000 UT) CAPS Take by mouth daily      PHENobarbital (LUMINAL)

## 2024-02-23 NOTE — ED NOTES
Med necessity faxed to [place]. Per Melyssa, she is calling around to other transport companies for earlier time.  DC time tbd.

## 2024-02-23 NOTE — ED PROVIDER NOTES
Activity: Not on file   Stress: Not on file   Social Connections: Not on file   Intimate Partner Violence: Not on file   Housing Stability: Not on file       No family history on file.    Allergies:  Latex, Aloe, Banana, Benadryl [diphenhydramine], Ciprofloxacin, Other, Pork-derived products, and Codeine    Home Medications:  Prior to Admission medications    Medication Sig Start Date End Date Taking? Authorizing Provider   tobramycin (TOBREX) 0.3 % ophthalmic solution Place 1 drop into the left eye every 6 hours for 14 days 2/23/24 3/8/24 Yes Lindsey Wiggins DO   cephALEXin (KEFLEX) 500 MG capsule Take 1 capsule by mouth 2 times daily 10/10/23   Anna Vale APRN - CNP   atorvastatin (LIPITOR) 80 MG tablet Take 1 tablet by mouth nightly 8/11/23   Cassie Donohue MD   clopidogrel (PLAVIX) 75 MG tablet Take 1 tablet by mouth daily 8/12/23   Cassie Donohue MD   levETIRAcetam (KEPPRA) 500 MG tablet Take 2 tablets by mouth 2 times daily    Arron Padron MD   Sodium Sulfate-Mag Sulfate-KCl (SUTAB) 6035-954-683 MG TABS Follow bowel prep instructions 8/3/23   Meron Razo MD   acetaminophen (TYLENOL) 325 MG tablet Take 2 tablets by mouth every 6 hours as needed for Pain    Arron Padron MD   aspirin 81 MG EC tablet Take 1 tablet by mouth in the morning and at bedtime    Arron Padron MD   carvedilol (COREG) 12.5 MG tablet Take 1 tablet by mouth 2 times daily (with meals)    Arron Padron MD   cloNIDine (CATAPRES) 0.1 MG tablet Take 1 tablet by mouth as needed Q12 PRN    Arron Padron MD   famotidine (PEPCID) 20 MG tablet Take 1 tablet by mouth 2 times daily    Arron Padron MD   ferrous sulfate (IRON 325) 325 (65 Fe) MG tablet Take 1 tablet by mouth daily (with breakfast)    Arron Padron MD   tamsulosin (FLOMAX) 0.4 MG capsule Take 1 capsule by mouth daily    Arron Padron MD   ketoconazole (NIZORAL) 2 % cream Apply topically daily Apply  topically daily.    Arron Padron MD   lisinopril (PRINIVIL;ZESTRIL) 10 MG tablet Take 1 tablet by mouth Daily with supper    Arron Padron MD   loperamide (IMODIUM) 2 MG capsule Take 1 capsule by mouth as needed for Diarrhea    Arron Padron MD   loratadine (CLARITIN) 10 MG capsule Take 1 capsule by mouth daily    Arron Padron MD   potassium chloride (KLOR-CON) 20 MEQ packet Take 20 mEq by mouth daily    Arron Padron MD   triamcinolone (KENALOG) 0.1 % cream Apply topically daily as needed Apply topically 2 times daily.    Arron Padron MD   Cholecalciferol (VITAMIN D3) 50 MCG (2000 UT) CAPS Take by mouth daily    Arron Padron MD   PHENobarbital (LUMINAL) 15 MG tablet Take 16.2 mg by mouth 2 times daily    Arron Padron MD   levETIRAcetam (KEPPRA) 250 MG tablet Take 2 tablets by mouth 2 times daily    Arron Padron MD   primidone (MYSOLINE) 250 MG tablet Take 1 tablet by mouth in the morning and 1 tablet in the evening.    Arron Padron MD   LORazepam (ATIVAN) 1 MG tablet Take 1 tablet by mouth nightly as needed for Anxiety.    Arron Padron MD   montelukast (SINGULAIR) 10 MG tablet Take 1 tablet by mouth daily    Arron Padron MD         REVIEW OF SYSTEMS       Review of Systems   Constitutional:  Negative for chills and fever.   HENT:  Positive for facial swelling. Negative for nosebleeds.    Eyes:  Positive for pain (discomfort) and visual disturbance.   Respiratory:  Negative for shortness of breath.    Cardiovascular:  Negative for chest pain.   Gastrointestinal:  Negative for nausea and vomiting.   Neurological:  Negative for dizziness and headaches.     PHYSICAL EXAM      INITIAL VITALS:   BP (!) 149/88   Pulse 70   Temp 97.9 °F (36.6 °C) (Oral)   Resp 14   SpO2 96%     Physical Exam  Vitals and nursing note reviewed.   Constitutional:       General: She is not in acute distress.     Appearance: Normal  August 9 2023.  There is again noted posterior bulge/outpouching of the left globe which is unchanged from August 2023.  This could potentially be due to a congenital/acquired defect in the you uvea-sclera.  The left lens is markedly hyperdense which may be due to cataract.  Please correlate with of Ophthalmologic exam.  Overall no acute process is evident by imaging.  Other than the left lens which appears slightly more rounded than the prior, left globe is essentially stable.    CT C-spine negative    Risk  Prescription drug management.      EMERGENCY DEPARTMENT COURSE:    ED Course as of 02/23/24 1819 Fri Feb 23, 2024   1243 Spoke w/ ophthalmology, he will be in to evaluate the pt around 1500 [JF]   1307 Spoke w/ ophthalmology. Recommend keeping eye shielded, ophthalmic antibiotics. She will not be going to the OR today, okayed her to eat.  [JF]   1442 Ophtho at bedside [JF]   1525 Ophthalmology evaluated the patient, completed slit-lamp exam.  Intraocular pressure 21.  Negative Gladys sign.  No abrasion.  Recommend keeping shield on at all times, antibiotic drops 4 times a day, and following up with her ophthalmologist on Monday.  [JF]   1543 Pt allergic to cipro, will continue tobramycin drops [JF]   1547 Updated [facility] at [facility] [JF]   1555 Spoke w/ social work, will work on getting pt transport back to [place] [JF]      ED Course User Index  [JF] Lindsey Wiggins DO       PROCEDURES:    CONSULTS:  IP CONSULT TO OPHTHALMOLOGY    CRITICAL CARE:  There was significant risk of life threatening deterioration of patient's condition requiring my direct management. Critical care time 5 minutes, excluding any documented procedures.    FINAL IMPRESSION      1. Left eye pain          DISPOSITION / PLAN     DISPOSITION Decision To Discharge 02/23/2024 03:37:18 PM      PATIENT REFERRED TO:  Brit Alcala DO  [address] [address]  [phone]    Schedule an appointment as soon as

## 2024-02-23 NOTE — ED PROVIDER NOTES
[facility]  FACULTY HANDOFF     3:07 PM EST  Handoff taken on the following patient from prior Attending Physician:  Pt Name: Alexandra Chen  PCP:  Brit Alcala DO    Attestation  I was available and discussed any additional care issues that arose and coordinated the management plans with the resident(s) caring for the patient during my duty period. Any areas of disagreement with resident's documentation of care or procedures are noted on the chart. I was personally present for the key portions of any/all procedures during my duty period. I have documented in the chart those procedures where I was not present during the key portions.         CHIEF COMPLAINT       Chief Complaint   Patient presents with    Eye Problem     Ocular Globe Rupture          CURRENT MEDICATIONS     Previous Medications  Previous Medications    ACETAMINOPHEN (TYLENOL) 325 MG TABLET    Take 2 tablets by mouth every 6 hours as needed for Pain    ASPIRIN 81 MG EC TABLET    Take 1 tablet by mouth in the morning and at bedtime    ATORVASTATIN (LIPITOR) 80 MG TABLET    Take 1 tablet by mouth nightly    CARVEDILOL (COREG) 12.5 MG TABLET    Take 1 tablet by mouth 2 times daily (with meals)    CEPHALEXIN (KEFLEX) 500 MG CAPSULE    Take 1 capsule by mouth 2 times daily    CHOLECALCIFEROL (VITAMIN D3) 50 MCG (2000 UT) CAPS    Take by mouth daily    CLONIDINE (CATAPRES) 0.1 MG TABLET    Take 1 tablet by mouth as needed Q12 PRN    CLOPIDOGREL (PLAVIX) 75 MG TABLET    Take 1 tablet by mouth daily    FAMOTIDINE (PEPCID) 20 MG TABLET    Take 1 tablet by mouth 2 times daily    FERROUS SULFATE (IRON 325) 325 (65 FE) MG TABLET    Take 1 tablet by mouth daily (with breakfast)    KETOCONAZOLE (NIZORAL) 2 % CREAM    Apply topically daily Apply topically daily.    LEVETIRACETAM (KEPPRA) 250 MG TABLET    Take 2 tablets by mouth 2 times daily    LEVETIRACETAM (KEPPRA) 500 MG TABLET    Take 2 tablets by mouth 2 times daily    LISINOPRIL  (PRINIVIL;ZESTRIL) 10 MG TABLET    Take 1 tablet by mouth Daily with supper    LOPERAMIDE (IMODIUM) 2 MG CAPSULE    Take 1 capsule by mouth as needed for Diarrhea    LORATADINE (CLARITIN) 10 MG CAPSULE    Take 1 capsule by mouth daily    LORAZEPAM (ATIVAN) 1 MG TABLET    Take 1 tablet by mouth nightly as needed for Anxiety.    MONTELUKAST (SINGULAIR) 10 MG TABLET    Take 1 tablet by mouth daily    PHENOBARBITAL (LUMINAL) 15 MG TABLET    Take 16.2 mg by mouth 2 times daily    POTASSIUM CHLORIDE (KLOR-CON) 20 MEQ PACKET    Take 20 mEq by mouth daily    PRIMIDONE (MYSOLINE) 250 MG TABLET    Take 1 tablet by mouth in the morning and 1 tablet in the evening.    SODIUM SULFATE-MAG SULFATE-KCL (SUTAB) 8172-042-518 MG TABS    Follow bowel prep instructions    TAMSULOSIN (FLOMAX) 0.4 MG CAPSULE    Take 1 capsule by mouth daily    TRIAMCINOLONE (KENALOG) 0.1 % CREAM    Apply topically daily as needed Apply topically 2 times daily.       Encounter Medications  Orders Placed This Encounter   Medications    tobramycin (TOBREX) 0.3 % ophthalmic solution 2 drop       ALLERGIES     is allergic to latex, aloe, banana, benadryl [diphenhydramine], ciprofloxacin, other, pork-derived products, and codeine.      RECENT VITALS:   Temp: 97.9 °F (36.6 °C),  Pulse: 70, Respirations: 14, BP: (!) 173/78    RADIOLOGY:   No orders to display       LABS:  Labs Reviewed - No data to display    Fall with periorbital contusion and possible globe rupture seen on Sidel sign, CT imaging negative, awaiting input from ophthalmology      PLAN/ TASKS OUTSTANDING     Ophthalmology consultation, disposition      (Please note that portions of this note were completed with a voice recognition program.  Efforts were made to edit the dictations but occasionally words are mis-transcribed.)    Mukesh Roca MD,, MD, F.A.C.E.P.  Attending Emergency Physician        Mukesh Roca MD  02/23/24 [id number]

## 2024-02-23 NOTE — ED TRIAGE NOTES
Patient presents to the ED via EMS. Patient was transferred here from [facility] ED due to a left occipital globe rupture. Patient is in NAD, respirations are even and unlabored, bed in lowest position and call light with in reach. Patient arrived to the ED with IV present. Patient is AOX4. Patient states that she was walking this morning and fell and hit her head on a trash can. Resident bedside for evaluation. Writer will continue with plan of care.

## 2024-02-23 NOTE — DISCHARGE INSTRUCTIONS
You were seen in the ER for left eye pain.  Ophthalmologist evaluated you, does not believe that the eye is ruptured but you will be started on antibiotics  Use antibiotic drops 4 times a day.  Keep shield on eye at all times unless applying drops.  Ophthalmologist recommends stopping Plavix, please speak with PCP  Please follow-up with your ophthalmologist on Monday or Tuesday for reevaluation.  Return to the ER if fevers, chills, worsening pain, or any other concerns.

## 2024-02-23 NOTE — ED NOTES
Patient given box lunch. Patient was offered warm meal but declined due to the selection that was offered.

## 2024-02-23 NOTE — ED NOTES
Patient removed from the bed pan. Patient was offered another boxed lunch but states that she wants to be transported back to her facility. Patient was advised again that we are waiting for transport and we have no idea when it will be available. Patient was unpleased and stated that she must be back to the facility before 1800 so she can order what she would like for dinner.

## 2024-02-23 NOTE — DISCHARGE INSTRUCTIONS
PLEASE RETURN TO THE EMERGENCY DEPARTMENT IMMEDIATELY if your symptoms worsen in anyway or in 8-12 hours if not improved for re-evaluation.  You should immediately return to the ER for symptoms such as new or worsening pain, fever, visual or hearing changes, stiff neck, rash, a feeling of passing out, chest pain, shortness of breath, persistent nausea and/or vomiting, numbness or weakness to the arms or legs, coolness or color change of the arms or legs.  You should avoid contact sports or activities where you might hit your head for a minimum of 1 week.      Take your medication as indicated and prescribed.  If you are given an antibiotic then, make sure you get the prescription filled and take the antibiotics until finished.      Please understand that at this time there is no evidence for a more serious underlying process, but that early in the process of an illness or injury, an emergency department workup can be falsely reassuring.  You should contact your family doctor within the next 24 hours for a follow up appointment    THANK YOU!!!    From [facility]    On behalf of the Emergency Department staff at [facility], I would like to thank you for giving us the opportunity to address your health care needs and concerns.    We hope that during your visit, our service was delivered in a professional and caring manner. Please keep [facility] in mind as we walk with you down the path to your own personal wellness.     Please expect an automated text message or email from us so we can ask a few questions about your health and progress. Based on your answers, a clinician may call you back to offer help and instructions.    Please understand that early in the process of an illness or injury, an emergency department workup can be falsely reassuring.  If you notice any worsening, changing or persistent symptoms please call your family doctor or return to the ER immediately.     Tell

## 2024-02-23 NOTE — ED NOTES
78F with ruptured globe after [facility]  CT head, neck, facial bones neg  DNRCC  Dr Schumacher aware and plans to come in to see pt

## 2024-02-26 NOTE — CONSULTS
[address] [address]                              CONSULTATION      PATIENT NAME: BERNICE MORSE             : 1945  MED REC NO: [id number]                         ROOM: ED:  Rm: 21  ACCOUNT NO: [id number]                       ADMIT DATE: 2024  PROVIDER: Jan Schumacher MD    OPHTHALMOLOGY CONSULT    CONSULT DATE:  2024    HISTORY OF PRESENT ILLNESS:  The patient was seen at [facility] in the emergency department at the bedside.  History was obtained from the patient and additional information was added by her sister on her arrival after history and physical exam was completed.  [facility] to the left orbit and eye.  She denies injury to the right eye.  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  At some point earlier in the day upon awakening, the patient fell out of bed at the care facility she resides at.  She struck the left side of her face, hitting the orbit and globe on a trash can sitting beside the bed.  She was taken to an outlying facility and transferred to [facility] after evaluation at that ED and phone consultation with this ophthalmologist.    PAST OCULAR HISTORY:  Significant for lens implant in the right eye.  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 Additional health information was reviewed in the [employer] medical record system.    MEDICATIONS:  Include aspirin, Plavix, cephalexin, Flomax.    ALLERGIES:  SHE IS ALLERGIC TO CIPRO WHICH GIVES HER A RASH.    PAST MEDICAL HISTORY:  Will not be detailed here and can be viewed in EMR system.    PHYSICAL EXAMINATION:  Right eye visual acuity was not determined.  The left eye vision was hand motion at 1 foot.    The

## 2025-01-08 ENCOUNTER — HOSPITAL ENCOUNTER (OUTPATIENT)
Age: 80
Setting detail: SPECIMEN
Discharge: HOME | End: 2025-01-08

## 2025-01-09 ENCOUNTER — HOSPITAL ENCOUNTER (OUTPATIENT)
Age: 80
Setting detail: SPECIMEN
Discharge: HOME | End: 2025-01-09
Payer: MEDICARE

## 2025-01-09 LAB
ALBUMIN SERPL-MCNC: 3.9 G/DL (ref 3.5–5.2)
ALBUMIN/GLOB SERPL: 1.3 {RATIO} (ref 1–2.5)
ALP SERPL-CCNC: 138 U/L (ref 35–104)
ALT SERPL-CCNC: 19 U/L (ref 10–35)
ANION GAP SERPL CALCULATED.3IONS-SCNC: 9 MMOL/L (ref 9–16)
AST SERPL-CCNC: 27 U/L (ref 10–35)
BILIRUB SERPL-MCNC: 0.2 MG/DL (ref 0–1.2)
BUN SERPL-MCNC: 14 MG/DL (ref 8–23)
CALCIUM SERPL-MCNC: 9.4 MG/DL (ref 8.8–10.2)
CHLORIDE SERPL-SCNC: 102 MMOL/L (ref 98–107)
CHOLEST SERPL-MCNC: 134 MG/DL (ref 0–199)
CHOLESTEROL/HDL RATIO: 2
CO2 SERPL-SCNC: 26 MMOL/L (ref 20–31)
CREAT SERPL-MCNC: 0.7 MG/DL (ref 0.5–0.9)
ERYTHROCYTE [DISTWIDTH] IN BLOOD BY AUTOMATED COUNT: 13.1 % (ref 11.8–14.4)
GFR, ESTIMATED: 86 ML/MIN/1.73M2
GLUCOSE SERPL-MCNC: 76 MG/DL (ref 82–115)
HCT VFR BLD AUTO: 34.2 % (ref 36.3–47.1)
HDLC SERPL-MCNC: 68 MG/DL
HGB BLD-MCNC: 11.7 G/DL (ref 11.9–15.1)
LDLC SERPL CALC-MCNC: 51 MG/DL (ref 0–100)
MCH RBC QN AUTO: 33.1 PG (ref 25.2–33.5)
MCHC RBC AUTO-ENTMCNC: 34.2 G/DL (ref 28.4–34.8)
MCV RBC AUTO: 96.9 FL (ref 82.6–102.9)
NRBC BLD-RTO: 0 PER 100 WBC
PLATELET # BLD AUTO: 150 K/UL (ref 138–453)
PMV BLD AUTO: 10.2 FL (ref 8.1–13.5)
POTASSIUM SERPL-SCNC: 4.1 MMOL/L (ref 3.7–5.3)
PROT SERPL-MCNC: 6.9 G/DL (ref 6.6–8.7)
RBC # BLD AUTO: 3.53 M/UL (ref 3.95–5.11)
SODIUM SERPL-SCNC: 137 MMOL/L (ref 136–145)
T4 FREE SERPL-MCNC: 1.3 NG/DL (ref 0.93–1.7)
TRIGL SERPL-MCNC: 73 MG/DL (ref 0–149)
TSH SERPL DL<=0.05 MIU/L-ACNC: 2.52 UIU/ML (ref 0.27–4.2)
VLDLC SERPL CALC-MCNC: 15 MG/DL (ref 1–30)
WBC OTHER # BLD: 6.2 K/UL (ref 3.5–11.3)

## 2025-01-09 PROCEDURE — 80053 COMPREHEN METABOLIC PANEL: CPT

## 2025-01-09 PROCEDURE — 84443 ASSAY THYROID STIM HORMONE: CPT

## 2025-01-09 PROCEDURE — 84439 ASSAY OF FREE THYROXINE: CPT

## 2025-01-09 PROCEDURE — P9603 ONE-WAY ALLOW PRORATED MILES: HCPCS

## 2025-01-09 PROCEDURE — 80061 LIPID PANEL: CPT

## 2025-01-09 PROCEDURE — 85027 COMPLETE CBC AUTOMATED: CPT

## 2025-01-09 PROCEDURE — 36415 COLL VENOUS BLD VENIPUNCTURE: CPT

## 2025-08-13 ENCOUNTER — HOSPITAL ENCOUNTER (OUTPATIENT)
Age: 80
Setting detail: SPECIMEN
Discharge: HOME | End: 2025-08-13
Payer: MEDICARE

## 2025-08-13 PROCEDURE — 81001 URINALYSIS AUTO W/SCOPE: CPT

## 2025-08-14 ENCOUNTER — HOSPITAL ENCOUNTER (OUTPATIENT)
Age: 80
Setting detail: SPECIMEN
Discharge: HOME | End: 2025-08-14
Payer: MEDICARE

## 2025-08-14 LAB
ANION GAP SERPL CALCULATED.3IONS-SCNC: 11 MMOL/L (ref 9–16)
BACTERIA URNS QL MICRO: ABNORMAL
BASOPHILS # BLD: 0.06 K/UL (ref 0–0.2)
BASOPHILS NFR BLD: 1 % (ref 0–2)
BILIRUB UR QL STRIP: NEGATIVE
BUN SERPL-MCNC: 17 MG/DL (ref 8–23)
CALCIUM SERPL-MCNC: 9 MG/DL (ref 8.8–10.2)
CHLORIDE SERPL-SCNC: 103 MMOL/L (ref 98–107)
CLARITY UR: CLEAR
CO2 SERPL-SCNC: 25 MMOL/L (ref 20–31)
COLOR UR: YELLOW
CREAT SERPL-MCNC: 0.8 MG/DL (ref 0.5–0.9)
EOSINOPHIL # BLD: 0.12 K/UL (ref 0–0.44)
EOSINOPHILS RELATIVE PERCENT: 2 % (ref 1–4)
EPI CELLS #/AREA URNS HPF: ABNORMAL /HPF (ref 0–5)
ERYTHROCYTE [DISTWIDTH] IN BLOOD BY AUTOMATED COUNT: 13.2 % (ref 11.8–14.4)
GFR, ESTIMATED: 72 ML/MIN/1.73M2
GLUCOSE SERPL-MCNC: 85 MG/DL (ref 82–115)
GLUCOSE UR STRIP-MCNC: NEGATIVE MG/DL
HCT VFR BLD AUTO: 32 % (ref 36.3–47.1)
HGB BLD-MCNC: 11 G/DL (ref 11.9–15.1)
HGB UR QL STRIP.AUTO: ABNORMAL
IMM GRANULOCYTES # BLD AUTO: 0 K/UL (ref 0–0.3)
IMM GRANULOCYTES NFR BLD: 0 %
KETONES UR STRIP-MCNC: NEGATIVE MG/DL
LEUKOCYTE ESTERASE UR QL STRIP: ABNORMAL
LYMPHOCYTES NFR BLD: 0.65 K/UL (ref 1.1–3.7)
LYMPHOCYTES RELATIVE PERCENT: 11 % (ref 24–43)
MCH RBC QN AUTO: 33.6 PG (ref 25.2–33.5)
MCHC RBC AUTO-ENTMCNC: 34.4 G/DL (ref 28.4–34.8)
MCV RBC AUTO: 97.9 FL (ref 82.6–102.9)
MONOCYTES NFR BLD: 0.3 K/UL (ref 0.1–1.2)
MONOCYTES NFR BLD: 5 % (ref 3–12)
NEUTROPHILS NFR BLD: 81 % (ref 36–65)
NEUTS SEG NFR BLD: 4.77 K/UL (ref 1.5–8.1)
NITRITE UR QL STRIP: POSITIVE
NRBC BLD-RTO: 0 PER 100 WBC
PH UR STRIP: 6.5 [PH] (ref 5–8)
PLATELET # BLD AUTO: 104 K/UL (ref 138–453)
PMV BLD AUTO: 11.1 FL (ref 8.1–13.5)
POTASSIUM SERPL-SCNC: 4.1 MMOL/L (ref 3.7–5.3)
PROT UR STRIP-MCNC: NEGATIVE MG/DL
RBC # BLD AUTO: 3.27 M/UL (ref 3.95–5.11)
RBC #/AREA URNS HPF: ABNORMAL /HPF (ref 0–2)
SODIUM SERPL-SCNC: 139 MMOL/L (ref 136–145)
SP GR UR STRIP: 1.01 (ref 1–1.03)
UROBILINOGEN UR STRIP-ACNC: NORMAL EU/DL (ref 0–1)
WBC #/AREA URNS HPF: ABNORMAL /HPF (ref 0–5)
WBC OTHER # BLD: 5.9 K/UL (ref 3.5–11.3)

## 2025-08-14 PROCEDURE — 80048 BASIC METABOLIC PNL TOTAL CA: CPT

## 2025-08-14 PROCEDURE — 85025 COMPLETE CBC W/AUTO DIFF WBC: CPT

## 2025-08-14 PROCEDURE — 36415 COLL VENOUS BLD VENIPUNCTURE: CPT

## 2025-09-02 ENCOUNTER — TELEPHONE (OUTPATIENT)
Dept: SURGERY | Age: 80
End: 2025-09-02